# Patient Record
Sex: MALE | Race: WHITE | Employment: FULL TIME | URBAN - METROPOLITAN AREA
[De-identification: names, ages, dates, MRNs, and addresses within clinical notes are randomized per-mention and may not be internally consistent; named-entity substitution may affect disease eponyms.]

---

## 2017-01-30 ENCOUNTER — OFFICE VISIT (OUTPATIENT)
Dept: OPHTHALMOLOGY | Facility: CLINIC | Age: 57
End: 2017-01-30

## 2017-01-30 DIAGNOSIS — H40.003 GLAUCOMA SUSPECT, BILATERAL: Primary | ICD-10-CM

## 2017-01-30 DIAGNOSIS — Z83.511 FAMILY HISTORY OF GLAUCOMA IN MOTHER: ICD-10-CM

## 2017-01-30 DIAGNOSIS — H52.13 MYOPIA WITH PRESBYOPIA OF BOTH EYES: ICD-10-CM

## 2017-01-30 DIAGNOSIS — H01.00A BLEPHARITIS OF UPPER AND LOWER EYELIDS OF BOTH EYES, UNSPECIFIED TYPE: ICD-10-CM

## 2017-01-30 DIAGNOSIS — H01.00B BLEPHARITIS OF UPPER AND LOWER EYELIDS OF BOTH EYES, UNSPECIFIED TYPE: ICD-10-CM

## 2017-01-30 DIAGNOSIS — H52.4 MYOPIA WITH PRESBYOPIA OF BOTH EYES: ICD-10-CM

## 2017-01-30 PROCEDURE — 92014 COMPRE OPH EXAM EST PT 1/>: CPT | Performed by: OPHTHALMOLOGY

## 2017-01-30 PROCEDURE — 92133 CPTRZD OPH DX IMG PST SGM ON: CPT | Performed by: OPHTHALMOLOGY

## 2017-01-30 PROCEDURE — 92083 EXTENDED VISUAL FIELD XM: CPT | Performed by: OPHTHALMOLOGY

## 2017-01-30 RX ORDER — ERYTHROMYCIN 5 MG/G
1 OINTMENT OPHTHALMIC NIGHTLY
Qty: 1 TUBE | Refills: 2 | Status: SHIPPED | OUTPATIENT
Start: 2017-01-30 | End: 2017-02-09

## 2017-01-30 NOTE — PROGRESS NOTES
Kody Obrien is a 49year old male. HPI:     HPI     EP/ 49 yr old here for a VF, OCT and EE. LDE 4/17/15 with glaucoma suspect due to increased cupping 0.4 OU, myopia and blepharitis OU. IOP adjustment of -4 in each eye. Pt's mom has COAG.  Pt denies Right Left   Dist cc 20/20 20/20   Near sc 20/40 20/25       Correction:  Glasses    Distance VA checked in phoropter  Pt forgot glasses      Tonometry (Applanation, 3:26 PM)      Right Left   Pressure 17 16         Pupils     3/3, 2+/2+, no APD        Vis all baby shampoo residue is gone. Alternately, you can use Ocusoft Lid scrubs (which you can purchase over the counter)  twice a day as directed. Start Erythromycin ointment on eyelids and lashes at bedtime in both eyes for 10 nights, then discontinue.

## 2017-01-30 NOTE — PATIENT INSTRUCTIONS
Glaucoma suspect  Visual field and OCT completed in office today with normal results in both eyes that were discussed with patient in office today. Family history of glaucoma in mother  Patient's mother has glaucoma.          Blepharitis    Patient i

## 2017-01-30 NOTE — ASSESSMENT & PLAN NOTE
Patient instructed to use eyelid hygiene twice daily. For baby shampoo eyelid hygiene:   apply baby shampoo to warm washcloth and gently scrub eyelids with eyes closed for 1 minute.    Then rinse thoroughly  with lukewarm water making sure all baby shampo

## 2017-01-30 NOTE — ASSESSMENT & PLAN NOTE
Visual field and OCT completed in office today with normal results in both eyes that were discussed with patient in office today. Intraocular pressure is normal; optic nerves are stable. There is no sign of glaucoma at this time.

## 2017-07-26 ENCOUNTER — TELEPHONE (OUTPATIENT)
Dept: NEPHROLOGY | Facility: CLINIC | Age: 57
End: 2017-07-26

## 2017-07-26 ENCOUNTER — OFFICE VISIT (OUTPATIENT)
Dept: DERMATOLOGY CLINIC | Facility: CLINIC | Age: 57
End: 2017-07-26

## 2017-07-26 DIAGNOSIS — L81.4 LENTIGO: ICD-10-CM

## 2017-07-26 DIAGNOSIS — D23.60 BENIGN NEOPLASM OF SKIN OF UPPER LIMB, INCLUDING SHOULDER, UNSPECIFIED LATERALITY: ICD-10-CM

## 2017-07-26 DIAGNOSIS — Z00.00 BLOOD TESTS FOR ROUTINE GENERAL PHYSICAL EXAMINATION: Primary | ICD-10-CM

## 2017-07-26 DIAGNOSIS — Z12.5 PROSTATE CANCER SCREENING: ICD-10-CM

## 2017-07-26 DIAGNOSIS — L21.9 SEBORRHEIC DERMATITIS: ICD-10-CM

## 2017-07-26 DIAGNOSIS — D23.30 BENIGN NEOPLASM OF SKIN OF FACE: ICD-10-CM

## 2017-07-26 DIAGNOSIS — D23.5 BENIGN NEOPLASM OF SKIN OF TRUNK, EXCEPT SCROTUM: ICD-10-CM

## 2017-07-26 DIAGNOSIS — D22.9 MULTIPLE NEVI: Primary | ICD-10-CM

## 2017-07-26 DIAGNOSIS — D23.70 BENIGN NEOPLASM OF SKIN OF LOWER LIMB, INCLUDING HIP, UNSPECIFIED LATERALITY: ICD-10-CM

## 2017-07-26 DIAGNOSIS — L82.1 SEBORRHEIC KERATOSES: ICD-10-CM

## 2017-07-26 DIAGNOSIS — D23.4 BENIGN NEOPLASM OF SCALP AND SKIN OF NECK: ICD-10-CM

## 2017-07-26 PROCEDURE — 99212 OFFICE O/P EST SF 10 MIN: CPT | Performed by: DERMATOLOGY

## 2017-07-26 PROCEDURE — 99214 OFFICE O/P EST MOD 30 MIN: CPT | Performed by: DERMATOLOGY

## 2017-07-26 NOTE — TELEPHONE ENCOUNTER
Pt is requesting orders for blood work to be put in prior to appt scheduled on 8/28/17.  Pt prefers to go to [facility]. For additional information Please Call [phone]  Thank You

## 2017-07-26 NOTE — TELEPHONE ENCOUNTER
LMTCB. Lab orders entered in system for [facility] and mailed to patient.  Instructed on VM to fast 12 hours for labs and requested a confirmation call back or if patient has any questions or concerns. (they were originally ordered on [facility] lab form but cance

## 2017-08-06 NOTE — PROGRESS NOTES
Isael Alvarado is a 49year old male. HPI:     CC:  Patient presents with:  Full Skin Exam: annual skin exam, father with hx of SCC. concerned about a red gorwing lesion to mid forehead. no discomfort.  one itchy lesion to scalp vertex        Allergies:  Re Myopia with presbyopia of both eyes 4/17/2015   • Nevus of conjunctiva- right eye nasally 4/17/2015   • Ophthalmological disorder     Incresed C/D ratio, +FH Mom with COAG   • Pinguecula of both eyes 2007     Past Surgical History:  No date: ARTHROSCOPY, K ears, back, chest,/ breasts, axillae,  abdomen, arms, legs, palms.      Multiple light to medium brown, well marginated, uniformly pigmented, macules and papules 6 mm and less scattered on exam. pigmented lesions examined with dermoscopy benign-appearing pa grid.  Instructions reviewed at length. Benign nevi, seborrheic  keratoses, cherry angiomas:  Reassurance regarding other benign skin lesions. Signs and symptoms of skin cancer, ABCDE's of melanoma discussed with patient.  Sunscreen use, sun protection, s

## 2017-08-24 ENCOUNTER — TELEPHONE (OUTPATIENT)
Dept: NEPHROLOGY | Facility: CLINIC | Age: 57
End: 2017-08-24

## 2017-08-24 NOTE — TELEPHONE ENCOUNTER
Pt would like to know if office has received his results from blood work done at [facility] on 8/8/17.  Please Call [phone] Thank You

## 2017-08-28 ENCOUNTER — OFFICE VISIT (OUTPATIENT)
Dept: NEPHROLOGY | Facility: CLINIC | Age: 57
End: 2017-08-28

## 2017-08-28 VITALS
SYSTOLIC BLOOD PRESSURE: 104 MMHG | BODY MASS INDEX: 21.23 KG/M2 | HEART RATE: 58 BPM | HEIGHT: 77 IN | WEIGHT: 179.81 LBS | DIASTOLIC BLOOD PRESSURE: 60 MMHG

## 2017-08-28 DIAGNOSIS — Z00.00 ROUTINE GENERAL MEDICAL EXAMINATION AT A HEALTH CARE FACILITY: Primary | ICD-10-CM

## 2017-08-28 PROCEDURE — 99396 PREV VISIT EST AGE 40-64: CPT | Performed by: INTERNAL MEDICINE

## 2017-08-28 RX ORDER — SILDENAFIL 50 MG/1
50 TABLET, FILM COATED ORAL
Qty: 6 TABLET | Refills: 3 | Status: SHIPPED | OUTPATIENT
Start: 2017-08-28 | End: 2019-09-16

## 2017-08-28 NOTE — TELEPHONE ENCOUNTER
Spoke to Customer Service at [facility] and they will fax results from 8/8/17. Patient has an appointment for today 8/28/17 with Dr. Reji Moreno.

## 2017-08-28 NOTE — PROGRESS NOTES
[facility]  Nephrology Daily Progress Note    Eric Perrin  [id number]  56year old  Patient presents with: Annual      HPI:   Eric Perrin is a 56year old male.   82-year-old male with a history of borderline cholesterols and erectile dysfunction noted  Respiratory: normal to inspection lungs are clear to auscultation bilaterally normal respiratory effort  Cardiovascular: regular rate and rhythm no murmurs, gallups, or rubs  Abdomen: soft non-tender non-distended no organomegaly noted no masses  Mu this encounter.       8/28/2017  Lanre Mathew MD

## 2017-09-27 ENCOUNTER — TELEPHONE (OUTPATIENT)
Dept: GASTROENTEROLOGY | Facility: CLINIC | Age: 57
End: 2017-09-27

## 2017-11-02 ENCOUNTER — TELEPHONE (OUTPATIENT)
Dept: GASTROENTEROLOGY | Facility: CLINIC | Age: 57
End: 2017-11-02

## 2017-11-02 NOTE — TELEPHONE ENCOUNTER
Pt received letter to schedule cln/procedure, pt informed about the 72 hr cb. Pls call at:[phone],thanks.

## 2017-11-02 NOTE — TELEPHONE ENCOUNTER
Last Procedure:  10/24/14  Last Diagnosis:  Family history of colon cancer, history of colon polyps  Recalled for (years): 3 years  Sedation used previously:  IV sedation  Last Prep Used (if known):  Miralax prep  Quality of prep (if known):  Prep was exce

## 2017-11-06 NOTE — TELEPHONE ENCOUNTER
Spoke with pt. Confirmed this time he DOES wish to proceed with IV sedation/Conscious sedation .     Confirmed no daily medication and no allergies to medications    No complicate or new medical history

## 2017-11-06 NOTE — TELEPHONE ENCOUNTER
Noted. Thank you. The patient's chart has been reviewed. Okay to schedule pt for 3 year colonoscopy recall r/t family history of colon cancer, history of colon polyps with Dr. Nesha Alvarado.      Advise IV [place] sedation with split dose Suprep (eRx)

## 2017-11-06 NOTE — TELEPHONE ENCOUNTER
Nursing: the report from 2014 indicates the pt completed the last colonoscopy w/o sedation. Does he wish to proceed the same way this time? If so, please make pt aware he will at least need IV access incase sedation needs to be administered.

## 2017-11-07 NOTE — TELEPHONE ENCOUNTER
Scheduled for:  Colonoscopy - 46059  Provider Name:  Dr. Susi Calderón  Date:  1/19/18  Location:  [facility]  Sedation:  IV  Time:  (pt is aware that [facility] will call with arrival time)  Prep:  Suprep, Prep instructions were given to pt over the phone, pt verbalized [place]

## 2018-01-15 ENCOUNTER — TELEPHONE (OUTPATIENT)
Dept: GASTROENTEROLOGY | Facility: CLINIC | Age: 58
End: 2018-01-15

## 2018-04-04 ENCOUNTER — TELEPHONE (OUTPATIENT)
Dept: GASTROENTEROLOGY | Facility: CLINIC | Age: 58
End: 2018-04-04

## 2018-04-04 NOTE — TELEPHONE ENCOUNTER
Pt requesting RN to obtain Prior auth for  4/13/2018 CLN. PLs call [facility] at [phone]. Thank you.

## 2018-04-04 NOTE — TELEPHONE ENCOUNTER
Spoke to Lisa from [facility], [facility] No PA is required. Reference number: [id number]. Spoke to pt, informed no PA is required. Pt was provided with Reference number. Pt verbalized understanding of whole message and had no further questions at this time.

## 2018-04-13 ENCOUNTER — LAB REQUISITION (OUTPATIENT)
Dept: LAB | Facility: HOSPITAL | Age: 58
End: 2018-04-13
Payer: COMMERCIAL

## 2018-04-13 DIAGNOSIS — Z01.89 ENCOUNTER FOR OTHER SPECIFIED SPECIAL EXAMINATIONS: ICD-10-CM

## 2018-04-13 PROCEDURE — 88305 TISSUE EXAM BY PATHOLOGIST: CPT | Performed by: INTERNAL MEDICINE

## 2018-04-24 ENCOUNTER — TELEPHONE (OUTPATIENT)
Dept: GASTROENTEROLOGY | Facility: CLINIC | Age: 58
End: 2018-04-24

## 2018-04-24 NOTE — TELEPHONE ENCOUNTER
Notes recorded by Thomas Pinto MD on 4/23/2018 at 7:32 PM CDT  Please see the following my chart message sent to the patient:    \"Jhonatan,    I left a message on your voicemail.     Your colonoscopy revealed one small polyp which was removed.  This po

## 2018-04-27 ENCOUNTER — HOSPITAL ENCOUNTER (OUTPATIENT)
Dept: CT IMAGING | Facility: HOSPITAL | Age: 58
Discharge: HOME | End: 2018-04-27
Attending: INTERNAL MEDICINE

## 2018-04-27 VITALS — SYSTOLIC BLOOD PRESSURE: 110 MMHG | HEART RATE: 64 BPM | DIASTOLIC BLOOD PRESSURE: 76 MMHG

## 2018-04-27 DIAGNOSIS — Z13.9 SCREENING FOR CONDITION: ICD-10-CM

## 2018-07-06 ENCOUNTER — TELEPHONE (OUTPATIENT)
Dept: NEPHROLOGY | Facility: CLINIC | Age: 58
End: 2018-07-06

## 2018-07-06 DIAGNOSIS — Z01.89 ROUTINE LAB DRAW: Primary | ICD-10-CM

## 2018-07-06 DIAGNOSIS — Z12.5 PROSTATE CANCER SCREENING: ICD-10-CM

## 2018-07-06 NOTE — TELEPHONE ENCOUNTER
Orders entered in system. Patient contacted. He will get lab work done at [facility]. [employer] and mailed to patient. Instructed to fast 12 hours for labs.

## 2018-07-06 NOTE — TELEPHONE ENCOUNTER
Pt is scheduled to see dr Afia Ingram in august and would like orders to be out in the system for labs and urinalysis please call thank you.

## 2018-08-06 ENCOUNTER — TELEPHONE (OUTPATIENT)
Dept: NEPHROLOGY | Facility: CLINIC | Age: 58
End: 2018-08-06

## 2018-08-06 NOTE — TELEPHONE ENCOUNTER
BONILLA reviewed labs from 7/26/18. Per BONILLA, labs in. Follow up as scheduled. Attempted to contact pt.  Left msg per consent on file that [facility] received results and to follow up with [facility] as scheduled on 8/28/18 at 4 PM. Lab results sent to scanning and copy kept

## 2018-08-28 ENCOUNTER — OFFICE VISIT (OUTPATIENT)
Dept: NEPHROLOGY | Facility: CLINIC | Age: 58
End: 2018-08-28
Payer: COMMERCIAL

## 2018-08-28 VITALS
HEIGHT: 77 IN | DIASTOLIC BLOOD PRESSURE: 61 MMHG | WEIGHT: 176.19 LBS | HEART RATE: 58 BPM | BODY MASS INDEX: 20.8 KG/M2 | SYSTOLIC BLOOD PRESSURE: 99 MMHG

## 2018-08-28 DIAGNOSIS — Z12.5 ENCOUNTER FOR PROSTATE CANCER SCREENING: Primary | ICD-10-CM

## 2018-08-28 DIAGNOSIS — Z00.00 ROUTINE GENERAL MEDICAL EXAMINATION AT A HEALTH CARE FACILITY: ICD-10-CM

## 2018-08-28 PROCEDURE — 99215 OFFICE O/P EST HI 40 MIN: CPT | Performed by: INTERNAL MEDICINE

## 2018-08-28 PROCEDURE — 99212 OFFICE O/P EST SF 10 MIN: CPT | Performed by: INTERNAL MEDICINE

## 2018-08-28 NOTE — TELEPHONE ENCOUNTER
LOV 7/26/2017. Pt has appointment scheduled for 9/28/2018. Ok to refill until appointment? Kindly advise.

## 2018-08-28 NOTE — PROGRESS NOTES
[facility]  Nephrology Daily Progress Note    Alla Faria  [id number]  66year old  Patient presents with: Annual      HPI:   Alla Faria is a 66year old male.   61-year-old male with a history of borderline cholesterols and erectile dysfunction clear to auscultation bilaterally normal respiratory effort  Cardiovascular: regular rate and rhythm no murmurs, gallups, or rubs  Abdomen: soft non-tender non-distended no organomegaly noted no masses  Musculoskeletal: full ROM all extremities good streng done on July 26, 2018. Lipids actually were bit better with a cholesterol of 186 and an LDL of 99. HDL was 76. His PSA however was 0.7 a year ago now it is 2.5. Therefore recommended repeating a PSA in 3-4 months to ensure stability.   Otherwise see allie

## 2018-09-07 ENCOUNTER — TELEPHONE (OUTPATIENT)
Dept: DERMATOLOGY CLINIC | Facility: CLINIC | Age: 58
End: 2018-09-07

## 2018-09-28 ENCOUNTER — OFFICE VISIT (OUTPATIENT)
Dept: DERMATOLOGY CLINIC | Facility: CLINIC | Age: 58
End: 2018-09-28
Payer: COMMERCIAL

## 2018-09-28 DIAGNOSIS — L81.4 LENTIGO: ICD-10-CM

## 2018-09-28 DIAGNOSIS — L82.1 SEBORRHEIC KERATOSES: ICD-10-CM

## 2018-09-28 DIAGNOSIS — D23.70 BENIGN NEOPLASM OF SKIN OF LOWER LIMB, INCLUDING HIP, UNSPECIFIED LATERALITY: ICD-10-CM

## 2018-09-28 DIAGNOSIS — D23.60 BENIGN NEOPLASM OF SKIN OF UPPER LIMB, INCLUDING SHOULDER, UNSPECIFIED LATERALITY: ICD-10-CM

## 2018-09-28 DIAGNOSIS — D23.5 BENIGN NEOPLASM OF SKIN OF TRUNK, EXCEPT SCROTUM: ICD-10-CM

## 2018-09-28 DIAGNOSIS — D23.4 BENIGN NEOPLASM OF SCALP AND SKIN OF NECK: ICD-10-CM

## 2018-09-28 DIAGNOSIS — L21.9 SEBORRHEIC DERMATITIS: ICD-10-CM

## 2018-09-28 DIAGNOSIS — D23.30 BENIGN NEOPLASM OF SKIN OF FACE: ICD-10-CM

## 2018-09-28 DIAGNOSIS — D22.9 MULTIPLE NEVI: Primary | ICD-10-CM

## 2018-09-28 PROCEDURE — 99214 OFFICE O/P EST MOD 30 MIN: CPT | Performed by: DERMATOLOGY

## 2018-09-28 PROCEDURE — 99212 OFFICE O/P EST SF 10 MIN: CPT | Performed by: DERMATOLOGY

## 2018-10-08 NOTE — PROGRESS NOTES
Twan Reyes is a 66year old male.   HPI:     CC:  Patient presents with:  Full Skin Exam: LOV 07/26/17 pt seen for full body check here today for full body check pt has a lesion on his mid forehead that hes had treated with crypo in the past, Father with Diagnosis Date   • Blepharitis 4/17/2015   • Blepharitis of both eyes 2002   • Cataracts, bilateral 2007   • Family history of glaucoma in mother 4/17/2015   • Glaucoma suspect 4/17/2015   • Myopia with presbyopia of both eyes 4/17/2015   • Nevus of conj History Narrative      Not on file    Family History   Problem Relation Age of Onset   • Hypertension Father    • Squamous Cell Father 80        SCC to ear.    • Cancer Mother         Cancer-colon   • Glaucoma Mother    • Diabetes Neg    • Macular degenerat ordered in this encounter         Multiple nevi  (primary encounter diagnosis)  Benign neoplasm of scalp and skin of neck  Benign neoplasm of skin of face  Benign neoplasm of skin of lower limb, including hip, unspecified laterality  Benign neoplasm of ski

## 2018-11-19 ENCOUNTER — TELEPHONE (OUTPATIENT)
Dept: NEPHROLOGY | Facility: CLINIC | Age: 58
End: 2018-11-19

## 2018-11-19 NOTE — TELEPHONE ENCOUNTER
BONILLA reviewed PSA from 11/1/18 that was faxed to office by Aubree Brooks. Per BONILLA, PSA normal, back down to 1.0. Left pt message per consent on file of the above info and advised to call office with any questions.

## 2018-12-10 ENCOUNTER — OFFICE VISIT (OUTPATIENT)
Dept: OPHTHALMOLOGY | Facility: CLINIC | Age: 58
End: 2018-12-10
Payer: COMMERCIAL

## 2018-12-10 DIAGNOSIS — H40.003 GLAUCOMA SUSPECT OF BOTH EYES: Primary | ICD-10-CM

## 2018-12-10 DIAGNOSIS — Z83.511 FAMILY HISTORY OF GLAUCOMA IN MOTHER: ICD-10-CM

## 2018-12-10 DIAGNOSIS — H01.00B BLEPHARITIS OF UPPER AND LOWER EYELIDS OF BOTH EYES, UNSPECIFIED TYPE: ICD-10-CM

## 2018-12-10 DIAGNOSIS — H52.13 MYOPIA WITH PRESBYOPIA OF BOTH EYES: ICD-10-CM

## 2018-12-10 DIAGNOSIS — H01.00A BLEPHARITIS OF UPPER AND LOWER EYELIDS OF BOTH EYES, UNSPECIFIED TYPE: ICD-10-CM

## 2018-12-10 DIAGNOSIS — H52.4 MYOPIA WITH PRESBYOPIA OF BOTH EYES: ICD-10-CM

## 2018-12-10 PROCEDURE — 92015 DETERMINE REFRACTIVE STATE: CPT | Performed by: OPHTHALMOLOGY

## 2018-12-10 PROCEDURE — 92014 COMPRE OPH EXAM EST PT 1/>: CPT | Performed by: OPHTHALMOLOGY

## 2018-12-10 PROCEDURE — 92133 CPTRZD OPH DX IMG PST SGM ON: CPT | Performed by: OPHTHALMOLOGY

## 2018-12-10 NOTE — PATIENT INSTRUCTIONS
Blepharitis  Patient instructed to use lid hygiene daily. Apply baby shampoo to warm washcloth and scrub eyelids gently with eyes closed, then rinse thoroughly. Glaucoma suspect  Doing well.  IOP and OCT normal.    Myopia with presbyopia of both eye

## 2018-12-10 NOTE — PROGRESS NOTES
Raul Reyes is a 66year old male. HPI:     HPI     EP/66year old here for an OCT and complete exam.  LDE was 1/30/17 with a hx of  glaucoma suspect due to increased cupping 0.4 OU, myopia and blepharitis OU. IOP adjustment of -4 in each eye.   Pt's m metRONIDAZOLE 0.75 % External Cream Use bid to affected areas of face Disp: 45 g Rfl: 2       Allergies:  No Known Allergies    ROS:       PHYSICAL EXAM:     Base Eye Exam     Visual Acuity (Snellen - Linear)       Right Left    Dist cc 20/20 20/20    Co well. IOP and OCT normal.    Myopia with presbyopia of both eyes  New glasses today.      Family history of glaucoma in mother  Mom with glaucoma      Orders Placed This Encounter      OCT, Optic Nerve - OU - Both Eyes      Meds This Visit:  Requested Presc

## 2019-08-23 ENCOUNTER — TELEPHONE (OUTPATIENT)
Dept: NEPHROLOGY | Facility: CLINIC | Age: 59
End: 2019-08-23

## 2019-08-23 DIAGNOSIS — Z00.00 LABORATORY EXAMINATION ORDERED AS PART OF A ROUTINE GENERAL MEDICAL EXAMINATION: Primary | ICD-10-CM

## 2019-08-23 DIAGNOSIS — Z12.5 PROSTATE CANCER SCREENING: ICD-10-CM

## 2019-08-23 NOTE — TELEPHONE ENCOUNTER
Orders entered in [address] [place] [address] for [employer]. Left detailed message on  regarding lab orders and fasting 12 hours. Patient has already scheduled a follow up visit with Dr. Daniella Caro for 9/13/19.

## 2019-08-28 NOTE — TELEPHONE ENCOUNTER
Pt requesting lab orders to be mailed out to home address - he will be going to [address] mail to:    [address] [facility] [place], [place]    For add'l questions pls call pt. Thank you.

## 2019-09-11 LAB
ABSOLUTE BASOPHILS: 48 CELLS/UL (ref 0–200)
ABSOLUTE EOSINOPHILS: 456 CELLS/UL (ref 15–500)
ABSOLUTE LYMPHOCYTES: 1482 CELLS/UL (ref 850–3900)
ABSOLUTE MONOCYTES: 444 CELLS/UL (ref 200–950)
ABSOLUTE NEUTROPHILS: 3570 CELLS/UL (ref 1500–7800)
ALBUMIN/GLOBULIN RATIO: 1.9 (CALC) (ref 1–2.5)
ALBUMIN: 4.5 G/DL (ref 3.6–5.1)
ALKALINE PHOSPHATASE: 66 U/L (ref 40–115)
ALT: 15 U/L (ref 9–46)
APPEARANCE: CLEAR
AST: 17 U/L (ref 10–35)
BASOPHILS: 0.8 %
BILIRUBIN, TOTAL: 0.6 MG/DL (ref 0.2–1.2)
BILIRUBIN: NEGATIVE
BUN: 11 MG/DL (ref 7–25)
CALCIUM: 9.3 MG/DL (ref 8.6–10.3)
CARBON DIOXIDE: 30 MMOL/L (ref 20–32)
CHLORIDE: 99 MMOL/L (ref 98–110)
CHOL/HDLC RATIO: 2.6 (CALC)
CHOLESTEROL, TOTAL: 180 MG/DL
COLOR: YELLOW
CREATININE: 1.03 MG/DL (ref 0.7–1.33)
EGFR IF AFRICN AM: 93 ML/MIN/1.73M2
EGFR IF NONAFRICN AM: 80 ML/MIN/1.73M2
EOSINOPHILS: 7.6 %
GLOBULIN: 2.4 G/DL (CALC) (ref 1.9–3.7)
GLUCOSE: 84 MG/DL (ref 65–99)
GLUCOSE: NEGATIVE
HDL CHOLESTEROL: 70 MG/DL
HEMATOCRIT: 42.4 % (ref 38.5–50)
HEMOGLOBIN: 14.3 G/DL (ref 13.2–17.1)
KETONES: NEGATIVE
LDL-CHOLESTEROL: 95 MG/DL (CALC)
LEUKOCYTE ESTERASE: NEGATIVE
LYMPHOCYTES: 24.7 %
MCH: 29.7 PG (ref 27–33)
MCHC: 33.7 G/DL (ref 32–36)
MCV: 88.1 FL (ref 80–100)
MONOCYTES: 7.4 %
MPV: 11.3 FL (ref 7.5–12.5)
NEUTROPHILS: 59.5 %
NITRITE: NEGATIVE
NON-HDL CHOLESTEROL: 110 MG/DL (CALC)
OCCULT BLOOD: NEGATIVE
PH: 7.5 (ref 5–8)
PLATELET COUNT: 200 THOUSAND/UL (ref 140–400)
POTASSIUM: 4.6 MMOL/L (ref 3.5–5.3)
PROTEIN, TOTAL: 6.9 G/DL (ref 6.1–8.1)
PROTEIN: NEGATIVE
PSA, TOTAL: 0.9 NG/ML
RDW: 12.2 % (ref 11–15)
RED BLOOD CELL COUNT: 4.81 MILLION/UL (ref 4.2–5.8)
SODIUM: 135 MMOL/L (ref 135–146)
SPECIFIC GRAVITY: 1.01 (ref 1–1.03)
TRIGLYCERIDES: 65 MG/DL
WHITE BLOOD CELL COUNT: 6 THOUSAND/UL (ref 3.8–10.8)

## 2019-09-16 ENCOUNTER — OFFICE VISIT (OUTPATIENT)
Dept: NEPHROLOGY | Facility: CLINIC | Age: 59
End: 2019-09-16
Payer: COMMERCIAL

## 2019-09-16 VITALS
WEIGHT: 177.81 LBS | BODY MASS INDEX: 20.99 KG/M2 | DIASTOLIC BLOOD PRESSURE: 64 MMHG | SYSTOLIC BLOOD PRESSURE: 100 MMHG | HEART RATE: 56 BPM | HEIGHT: 77 IN

## 2019-09-16 DIAGNOSIS — Z00.00 ROUTINE GENERAL MEDICAL EXAMINATION AT A HEALTH CARE FACILITY: Primary | ICD-10-CM

## 2019-09-16 PROCEDURE — 99396 PREV VISIT EST AGE 40-64: CPT | Performed by: INTERNAL MEDICINE

## 2019-09-16 RX ORDER — SILDENAFIL 50 MG/1
50 TABLET, FILM COATED ORAL
Qty: 6 TABLET | Refills: 5 | Status: SHIPPED | OUTPATIENT
Start: 2019-09-16 | End: 2020-09-18

## 2019-09-16 NOTE — PROGRESS NOTES
[facility]  Nephrology Daily Progress Note    Alla Faria  [id number]  64year old  Patient presents with: Annual      HPI:   Alla Faria is a 64year old male.   82-year-old male with a history of borderline cholesterol, actinic keratoses, hemorr cervical, supraclavicular or axillary adenopathy is noted  Respiratory: normal to inspection lungs are clear to auscultation bilaterally normal respiratory effort  Cardiovascular: regular rate and rhythm no murmurs, gallups, or rubs  Abdomen: soft non-tend well.  Reviewed recent labs done on September 10, 2019. All laboratory values were within normal limits including lipid profile and PSA. See yearly or as needed. No orders of the defined types were placed in this encounter.       9/16/2019  Jada Becerril

## 2019-10-30 ENCOUNTER — OFFICE VISIT (OUTPATIENT)
Dept: DERMATOLOGY CLINIC | Facility: CLINIC | Age: 59
End: 2019-10-30
Payer: COMMERCIAL

## 2019-10-30 DIAGNOSIS — D23.60 BENIGN NEOPLASM OF SKIN OF UPPER LIMB, INCLUDING SHOULDER, UNSPECIFIED LATERALITY: ICD-10-CM

## 2019-10-30 DIAGNOSIS — L82.1 SEBORRHEIC KERATOSES: ICD-10-CM

## 2019-10-30 DIAGNOSIS — D23.70 BENIGN NEOPLASM OF SKIN OF LOWER LIMB, INCLUDING HIP, UNSPECIFIED LATERALITY: ICD-10-CM

## 2019-10-30 DIAGNOSIS — D23.30 BENIGN NEOPLASM OF SKIN OF FACE: ICD-10-CM

## 2019-10-30 DIAGNOSIS — D23.4 BENIGN NEOPLASM OF SCALP AND SKIN OF NECK: ICD-10-CM

## 2019-10-30 DIAGNOSIS — D22.9 MULTIPLE NEVI: Primary | ICD-10-CM

## 2019-10-30 DIAGNOSIS — D23.5 BENIGN NEOPLASM OF SKIN OF TRUNK, EXCEPT SCROTUM: ICD-10-CM

## 2019-10-30 DIAGNOSIS — L81.4 LENTIGO: ICD-10-CM

## 2019-10-30 PROCEDURE — 99212 OFFICE O/P EST SF 10 MIN: CPT | Performed by: DERMATOLOGY

## 2019-10-30 PROCEDURE — 99213 OFFICE O/P EST LOW 20 MIN: CPT | Performed by: DERMATOLOGY

## 2019-11-11 NOTE — PROGRESS NOTES
Benita Morales is a 64year old male. HPI:     CC:  Patient presents with:  Full Skin Exam: LOV 9/28/2018  Patient present for full body skin exam . Patient denies personal hx of sc        Allergies:  Patient has no known allergies.     HISTORY:    Past Med both eyes 2007     Past Surgical History:   Procedure Laterality Date   • ARTHROSCOPY, KNEE, SURGICAL; W/LATERAL RELEASE       Social History    Socioeconomic History      Marital status:       Spouse name: Not on file      Number of children: Not o Sleep Concern: Not Asked        Stress Concern: Not Asked        Weight Concern: Not Asked        Special Diet: Not Asked        Back Care: Not Asked        Exercise: Not Asked        Bike Helmet: Not Asked        Seat Belt: Not Asked        Self-Exams: No mm and less scattered on exam. pigmented lesions examined with dermoscopy benign-appearing patterns. Waxy tannish keratotic papules scattered, cherry-red vascular papules scattered. See map today's date for lesions noted .       Otherwise remarkable diagnoses. Pros cons of various therapies, risks benefits discussed. Pathophysiology discussed with patient. Therapeutic options reviewed. See  Medications in grid. Instructions reviewed at length.     Benign nevi, seborrheic  keratoses, cherry angiomas:

## 2020-02-20 ENCOUNTER — HOSPITAL ENCOUNTER (OUTPATIENT)
Dept: GENERAL RADIOLOGY | Facility: HOSPITAL | Age: 60
Discharge: HOME | End: 2020-02-20
Attending: PODIATRIST
Payer: COMMERCIAL

## 2020-02-20 ENCOUNTER — OFFICE VISIT (OUTPATIENT)
Dept: PODIATRY CLINIC | Facility: CLINIC | Age: 60
End: 2020-02-20
Payer: COMMERCIAL

## 2020-02-20 DIAGNOSIS — M20.11 HALLUX VALGUS OF RIGHT FOOT: ICD-10-CM

## 2020-02-20 DIAGNOSIS — M79.674 PAIN OF TOE OF RIGHT FOOT: ICD-10-CM

## 2020-02-20 DIAGNOSIS — L84 HELOMA DURUM: ICD-10-CM

## 2020-02-20 DIAGNOSIS — M20.41 HAMMER TOE OF RIGHT FOOT: Primary | ICD-10-CM

## 2020-02-20 DIAGNOSIS — M20.41 HAMMER TOE OF RIGHT FOOT: ICD-10-CM

## 2020-02-20 PROCEDURE — 99212 OFFICE O/P EST SF 10 MIN: CPT | Performed by: PODIATRIST

## 2020-02-20 PROCEDURE — 99203 OFFICE O/P NEW LOW 30 MIN: CPT | Performed by: PODIATRIST

## 2020-02-20 PROCEDURE — 73630 X-RAY EXAM OF FOOT: CPT | Performed by: PODIATRIST

## 2020-02-21 NOTE — PROGRESS NOTES
Toshia Henderson is a 64year old male. Patient presents with:  Consult: right hallux stiffness and right 2nd toe injury from years ago -- no xrays taken. Toes have a little swelling occasionally. Rates pain  2-8/10. States hallux has a little numbness. Socioeconomic History      Marital status:       Spouse name: Not on file      Number of children: Not on file      Years of education: Not on file      Highest education level: Not on file    Tobacco Use      Smoking status: Never Smoker      Smoke has some diminished joint space. There is some osteoarthritic changes noted to the first MTP joint. There is a deviation of the articular surface of the second toe this is deviated laterally.   There is prominence of the head of the proximal phalanx adjac

## 2020-08-17 ENCOUNTER — TELEPHONE (OUTPATIENT)
Dept: GASTROENTEROLOGY | Facility: CLINIC | Age: 60
End: 2020-08-17

## 2020-08-17 DIAGNOSIS — Z00.00 LABORATORY EXAMINATION ORDERED AS PART OF A ROUTINE GENERAL MEDICAL EXAMINATION: Primary | ICD-10-CM

## 2020-08-17 DIAGNOSIS — Z12.5 SPECIAL SCREENING FOR MALIGNANT NEOPLASM OF PROSTATE: ICD-10-CM

## 2020-08-17 NOTE — TELEPHONE ENCOUNTER
Pt requesting to have orders for labs sent to [address] [place] [address] located in [facility] on [place].   Pt asking for labs to be coding as routine check up or annual physical.

## 2020-08-17 NOTE — TELEPHONE ENCOUNTER
Orders entered in system. We don't have any [facility] fax numbers so orders mailed to patient.

## 2020-08-29 LAB
ABSOLUTE BASOPHILS: 74 CELLS/UL (ref 0–200)
ABSOLUTE EOSINOPHILS: 563 CELLS/UL (ref 15–500)
ABSOLUTE LYMPHOCYTES: 1487 CELLS/UL (ref 850–3900)
ABSOLUTE MONOCYTES: 516 CELLS/UL (ref 200–950)
ABSOLUTE NEUTROPHILS: 4060 CELLS/UL (ref 1500–7800)
ALBUMIN/GLOBULIN RATIO: 2.7 (CALC) (ref 1–2.5)
ALBUMIN: 4.9 G/DL (ref 3.6–5.1)
ALKALINE PHOSPHATASE: 65 U/L (ref 35–144)
ALT: 12 U/L (ref 9–46)
APPEARANCE: CLEAR
AST: 16 U/L (ref 10–35)
BASOPHILS: 1.1 %
BILIRUBIN, TOTAL: 0.5 MG/DL (ref 0.2–1.2)
BILIRUBIN: NEGATIVE
BUN: 11 MG/DL (ref 7–25)
CALCIUM: 9.4 MG/DL (ref 8.6–10.3)
CARBON DIOXIDE: 30 MMOL/L (ref 20–32)
CHLORIDE: 103 MMOL/L (ref 98–110)
CHOL/HDLC RATIO: 2.2 (CALC)
CHOLESTEROL, TOTAL: 172 MG/DL
COLOR: YELLOW
CREATININE: 1.02 MG/DL (ref 0.7–1.33)
EGFR IF AFRICN AM: 93 ML/MIN/1.73M2
EGFR IF NONAFRICN AM: 81 ML/MIN/1.73M2
EOSINOPHILS: 8.4 %
GLOBULIN: 1.8 G/DL (CALC) (ref 1.9–3.7)
GLUCOSE: 91 MG/DL (ref 65–99)
GLUCOSE: NEGATIVE
HDL CHOLESTEROL: 78 MG/DL
HEMATOCRIT: 40.2 % (ref 38.5–50)
HEMOGLOBIN: 13.3 G/DL (ref 13.2–17.1)
KETONES: NEGATIVE
LDL-CHOLESTEROL: 81 MG/DL (CALC)
LEUKOCYTE ESTERASE: NEGATIVE
LYMPHOCYTES: 22.2 %
MCH: 28.7 PG (ref 27–33)
MCHC: 33.1 G/DL (ref 32–36)
MCV: 86.8 FL (ref 80–100)
MONOCYTES: 7.7 %
MPV: 11.5 FL (ref 7.5–12.5)
NEUTROPHILS: 60.6 %
NITRITE: NEGATIVE
NON-HDL CHOLESTEROL: 94 MG/DL (CALC)
OCCULT BLOOD: NEGATIVE
PH: 7.5 (ref 5–8)
PLATELET COUNT: 193 THOUSAND/UL (ref 140–400)
POTASSIUM: 4.8 MMOL/L (ref 3.5–5.3)
PROTEIN, TOTAL: 6.7 G/DL (ref 6.1–8.1)
PROTEIN: NEGATIVE
PSA, TOTAL: 0.7 NG/ML
RDW: 12.4 % (ref 11–15)
RED BLOOD CELL COUNT: 4.63 MILLION/UL (ref 4.2–5.8)
SODIUM: 138 MMOL/L (ref 135–146)
SPECIFIC GRAVITY: 1.01 (ref 1–1.03)
TRIGLYCERIDES: 52 MG/DL
WHITE BLOOD CELL COUNT: 6.7 THOUSAND/UL (ref 3.8–10.8)

## 2020-09-18 ENCOUNTER — OFFICE VISIT (OUTPATIENT)
Dept: NEPHROLOGY | Facility: CLINIC | Age: 60
End: 2020-09-18
Payer: COMMERCIAL

## 2020-09-18 VITALS
BODY MASS INDEX: 21.21 KG/M2 | HEIGHT: 77 IN | DIASTOLIC BLOOD PRESSURE: 66 MMHG | HEART RATE: 65 BPM | TEMPERATURE: 97 F | WEIGHT: 179.63 LBS | SYSTOLIC BLOOD PRESSURE: 107 MMHG

## 2020-09-18 DIAGNOSIS — Z00.00 ROUTINE GENERAL MEDICAL EXAMINATION AT A HEALTH CARE FACILITY: Primary | ICD-10-CM

## 2020-09-18 PROCEDURE — 3008F BODY MASS INDEX DOCD: CPT | Performed by: INTERNAL MEDICINE

## 2020-09-18 PROCEDURE — G0463 HOSPITAL OUTPT CLINIC VISIT: HCPCS | Performed by: INTERNAL MEDICINE

## 2020-09-18 PROCEDURE — 99396 PREV VISIT EST AGE 40-64: CPT | Performed by: INTERNAL MEDICINE

## 2020-09-18 PROCEDURE — 3074F SYST BP LT 130 MM HG: CPT | Performed by: INTERNAL MEDICINE

## 2020-09-18 PROCEDURE — 3078F DIAST BP <80 MM HG: CPT | Performed by: INTERNAL MEDICINE

## 2020-09-18 RX ORDER — SILDENAFIL 50 MG/1
50 TABLET, FILM COATED ORAL
Qty: 6 TABLET | Refills: 5 | Status: SHIPPED | OUTPATIENT
Start: 2020-09-18

## 2020-09-19 NOTE — PROGRESS NOTES
[facility]  Nephrology Daily Progress Note    Garrett Golden  [id number]  64year old  Patient presents with: Annual      HPI:   Garrett Golden is a 64year old male.   17-year-old male with a history of borderline cholesterol, actinic keratoses, hemorr inspection lungs are clear to auscultation bilaterally normal respiratory effort  Cardiovascular: regular rate and rhythm no murmurs, gallups, or rubs  Abdomen: soft non-tender non-distended no organomegaly noted no masses  Musculoskeletal: full ROM all ex Lipid panel actually better now. Has been watching his diet more carefully. See dermatology yearly. He would prefer a male dermatologist.  Referred to see Dr. Trinity Mims. See yearly or as needed.          No orders of the defined types were placed in th

## 2021-08-11 ENCOUNTER — TELEPHONE (OUTPATIENT)
Dept: NEPHROLOGY | Facility: CLINIC | Age: 61
End: 2021-08-11

## 2021-08-11 DIAGNOSIS — Z00.00 LABORATORY EXAMINATION ORDERED AS PART OF A ROUTINE GENERAL MEDICAL EXAMINATION: Primary | ICD-10-CM

## 2021-08-11 DIAGNOSIS — Z12.5 SPECIAL SCREENING FOR MALIGNANT NEOPLASM OF PROSTATE: ICD-10-CM

## 2021-08-11 NOTE — TELEPHONE ENCOUNTER
Pt called in requesting for blood work order to be put in prior to his next appt sent to [facility].  Please call pt when order is put in

## 2021-08-26 LAB
ABSOLUTE BASOPHILS: 50 CELLS/UL (ref 0–200)
ABSOLUTE EOSINOPHILS: 280 CELLS/UL (ref 15–500)
ABSOLUTE LYMPHOCYTES: 1333 CELLS/UL (ref 850–3900)
ABSOLUTE MONOCYTES: 459 CELLS/UL (ref 200–950)
ABSOLUTE NEUTROPHILS: 3478 CELLS/UL (ref 1500–7800)
ALBUMIN/GLOBULIN RATIO: 1.8 (CALC) (ref 1–2.5)
ALBUMIN: 4.4 G/DL (ref 3.6–5.1)
ALKALINE PHOSPHATASE: 65 U/L (ref 35–144)
ALT: 14 U/L (ref 9–46)
APPEARANCE: CLEAR
AST: 16 U/L (ref 10–35)
BASOPHILS: 0.9 %
BILIRUBIN, TOTAL: 0.6 MG/DL (ref 0.2–1.2)
BILIRUBIN: NEGATIVE
BUN: 11 MG/DL (ref 7–25)
CALCIUM: 9.4 MG/DL (ref 8.6–10.3)
CARBON DIOXIDE: 30 MMOL/L (ref 20–32)
CHLORIDE: 100 MMOL/L (ref 98–110)
CHOL/HDLC RATIO: 2.5 (CALC)
CHOLESTEROL, TOTAL: 182 MG/DL
COLOR: YELLOW
CREATININE: 1.04 MG/DL (ref 0.7–1.33)
EGFR IF AFRICN AM: 91 ML/MIN/1.73M2
EGFR IF NONAFRICN AM: 78 ML/MIN/1.73M2
EOSINOPHILS: 5 %
GLOBULIN: 2.5 G/DL (CALC) (ref 1.9–3.7)
GLUCOSE: 91 MG/DL (ref 65–99)
GLUCOSE: NEGATIVE
HDL CHOLESTEROL: 73 MG/DL
HEMATOCRIT: 42.3 % (ref 38.5–50)
HEMOGLOBIN: 14.1 G/DL (ref 13.2–17.1)
KETONES: NEGATIVE
LDL-CHOLESTEROL: 94 MG/DL (CALC)
LEUKOCYTE ESTERASE: NEGATIVE
LYMPHOCYTES: 23.8 %
MCH: 29.4 PG (ref 27–33)
MCHC: 33.3 G/DL (ref 32–36)
MCV: 88.3 FL (ref 80–100)
MONOCYTES: 8.2 %
MPV: 11.4 FL (ref 7.5–12.5)
NEUTROPHILS: 62.1 %
NITRITE: NEGATIVE
NON-HDL CHOLESTEROL: 109 MG/DL (CALC)
OCCULT BLOOD: NEGATIVE
PH: 7 (ref 5–8)
PLATELET COUNT: 197 THOUSAND/UL (ref 140–400)
POTASSIUM: 5 MMOL/L (ref 3.5–5.3)
PROTEIN, TOTAL: 6.9 G/DL (ref 6.1–8.1)
PROTEIN: NEGATIVE
PSA, TOTAL: 0.6 NG/ML
RDW: 12.2 % (ref 11–15)
RED BLOOD CELL COUNT: 4.79 MILLION/UL (ref 4.2–5.8)
SODIUM: 136 MMOL/L (ref 135–146)
SPECIFIC GRAVITY: 1.01 (ref 1–1.03)
TRIGLYCERIDES: 68 MG/DL
WHITE BLOOD CELL COUNT: 5.6 THOUSAND/UL (ref 3.8–10.8)

## 2021-10-06 ENCOUNTER — OFFICE VISIT (OUTPATIENT)
Dept: NEPHROLOGY | Facility: CLINIC | Age: 61
End: 2021-10-06
Payer: COMMERCIAL

## 2021-10-06 VITALS
SYSTOLIC BLOOD PRESSURE: 95 MMHG | WEIGHT: 176 LBS | DIASTOLIC BLOOD PRESSURE: 57 MMHG | BODY MASS INDEX: 20.78 KG/M2 | HEIGHT: 77 IN | HEART RATE: 62 BPM

## 2021-10-06 DIAGNOSIS — Z00.00 ROUTINE GENERAL MEDICAL EXAMINATION AT A HEALTH CARE FACILITY: Primary | ICD-10-CM

## 2021-10-06 DIAGNOSIS — H93.13 TINNITUS OF BOTH EARS: ICD-10-CM

## 2021-10-06 PROCEDURE — 99396 PREV VISIT EST AGE 40-64: CPT | Performed by: INTERNAL MEDICINE

## 2021-10-06 PROCEDURE — 3078F DIAST BP <80 MM HG: CPT | Performed by: INTERNAL MEDICINE

## 2021-10-06 PROCEDURE — 3074F SYST BP LT 130 MM HG: CPT | Performed by: INTERNAL MEDICINE

## 2021-10-06 PROCEDURE — 3008F BODY MASS INDEX DOCD: CPT | Performed by: INTERNAL MEDICINE

## 2021-10-07 NOTE — PROGRESS NOTES
[facility], [facility]  Nephrology Daily Progress Note    Valentin Israel  [id number]  63year old  Patient presents with: Follow - Up: Follow up regular Annual check up      HPI:   Valentin Israel is a 63year old male.   53-year-old male with a history of borderlin alert and responsive no acute distress noted  Neck/Thyroid: neck is supple without adenopathy. Tympanic membranes benign.   No cerumen  Lymphatic: no abnormal cervical, supraclavicular or axillary adenopathy is noted  Respiratory: normal to inspection lung glaucoma in mother      Overall the patient is doing well. Reviewed recent labs from August 25, 2021. Overall acceptable. Cholesterol 182 with an LDL of 94. See yearly or as needed. Referred to ENT for evaluation of tinnitus.          No orders of the

## 2021-10-08 ENCOUNTER — OFFICE VISIT (OUTPATIENT)
Dept: DERMATOLOGY CLINIC | Facility: CLINIC | Age: 61
End: 2021-10-08
Payer: COMMERCIAL

## 2021-10-08 DIAGNOSIS — D23.4 BENIGN NEOPLASM OF SCALP AND SKIN OF NECK: ICD-10-CM

## 2021-10-08 DIAGNOSIS — L81.4 LENTIGO: ICD-10-CM

## 2021-10-08 DIAGNOSIS — D22.9 MULTIPLE NEVI: Primary | ICD-10-CM

## 2021-10-08 DIAGNOSIS — D23.5 BENIGN NEOPLASM OF SKIN OF TRUNK, EXCEPT SCROTUM: ICD-10-CM

## 2021-10-08 DIAGNOSIS — L82.1 SEBORRHEIC KERATOSES: ICD-10-CM

## 2021-10-08 DIAGNOSIS — D23.30 BENIGN NEOPLASM OF SKIN OF FACE: ICD-10-CM

## 2021-10-08 DIAGNOSIS — D23.60 BENIGN NEOPLASM OF SKIN OF UPPER LIMB, INCLUDING SHOULDER, UNSPECIFIED LATERALITY: ICD-10-CM

## 2021-10-08 DIAGNOSIS — D23.70 BENIGN NEOPLASM OF SKIN OF LOWER LIMB, INCLUDING HIP, UNSPECIFIED LATERALITY: ICD-10-CM

## 2021-10-08 PROCEDURE — 99213 OFFICE O/P EST LOW 20 MIN: CPT | Performed by: DERMATOLOGY

## 2021-10-18 NOTE — PROGRESS NOTES
Skip Ramesh is a 63year old male. HPI:     CC:  Patient presents with:  Full Skin Exam: Family hx of melaona. LOV 10/30/19. Pt presents for full body exam. No new conerns. Allergies:  Patient has no known allergies.     HISTORY:    Past [employer] RELEASE       Social History    Socioeconomic History      Marital status:       Spouse name: Not on file      Number of children: Not on file      Years of education: Not on file      Highest education level: Not on file    Occupational History and Family: Not on file      Attends Taoism Services: Not on file      Active Member of Clubs or Organizations: Not on file      Attends Club or Organization Meetings: Not on file      Marital Status: Not on file  Intimate Partner Violence:       Fear o face,nails, hair, external eyes, including conjunctival mucosa, eyelids, lips external ears, back, chest,/ breasts, axillae,  abdomen, arms, legs, palms.      Multiple light to medium brown, well marginated, uniformly pigmented, macules and papules 6 mm and brows. Patient with history of steroid-induced rosacea, facial steroid addiction overall stable. Continue moisturizers, consider additional topicals will treat cautiously with topicals. Consider systemic doxy if more inflammatory component worsening.   Aw

## 2021-12-29 ENCOUNTER — TELEPHONE (OUTPATIENT)
Dept: DERMATOLOGY CLINIC | Facility: CLINIC | Age: 61
End: 2021-12-29

## 2021-12-30 RX ORDER — EFINACONAZOLE 100 MG/ML
1 SOLUTION TOPICAL DAILY
Qty: 1 EACH | Refills: 3 | Status: SHIPPED | OUTPATIENT
Start: 2021-12-30 | End: 2022-01-05

## 2022-01-05 RX ORDER — EFINACONAZOLE 100 MG/ML
1 SOLUTION TOPICAL DAILY
Qty: 1 EACH | Refills: 3 | Status: SHIPPED | OUTPATIENT
Start: 2022-01-05 | End: 2022-01-11

## 2022-01-11 NOTE — TELEPHONE ENCOUNTER
Patient called    Asking to now have Jublia to be sent to [facility] in [place]. Updated pharmacy information.

## 2022-09-07 ENCOUNTER — OFFICE VISIT (OUTPATIENT)
Dept: INTERNAL MEDICINE CLINIC | Facility: CLINIC | Age: 62
End: 2022-09-07
Payer: COMMERCIAL

## 2022-09-07 VITALS
SYSTOLIC BLOOD PRESSURE: 102 MMHG | HEART RATE: 87 BPM | OXYGEN SATURATION: 98 % | DIASTOLIC BLOOD PRESSURE: 76 MMHG | TEMPERATURE: 98 F | HEIGHT: 77 IN | BODY MASS INDEX: 20.99 KG/M2 | WEIGHT: 177.81 LBS

## 2022-09-07 DIAGNOSIS — N52.9 ERECTILE DYSFUNCTION, UNSPECIFIED ERECTILE DYSFUNCTION TYPE: ICD-10-CM

## 2022-09-07 DIAGNOSIS — Z00.00 HEALTH MAINTENANCE EXAMINATION: Primary | ICD-10-CM

## 2022-09-07 DIAGNOSIS — R25.1 TREMOR: ICD-10-CM

## 2022-09-07 DIAGNOSIS — H61.92 LESION OF EXTERNAL EAR CANAL, LEFT: ICD-10-CM

## 2022-09-07 DIAGNOSIS — H93.13 TINNITUS OF BOTH EARS: ICD-10-CM

## 2022-09-07 DIAGNOSIS — D22.9 MULTIPLE NEVI: ICD-10-CM

## 2022-09-07 PROBLEM — L81.4 LENTIGO: Status: ACTIVE | Noted: 2022-09-07

## 2022-09-07 PROBLEM — L82.1 SEBORRHEIC KERATOSIS: Status: ACTIVE | Noted: 2022-09-07

## 2022-09-07 PROCEDURE — 3008F BODY MASS INDEX DOCD: CPT | Performed by: FAMILY MEDICINE

## 2022-09-07 PROCEDURE — 3074F SYST BP LT 130 MM HG: CPT | Performed by: FAMILY MEDICINE

## 2022-09-07 PROCEDURE — 99386 PREV VISIT NEW AGE 40-64: CPT | Performed by: FAMILY MEDICINE

## 2022-09-07 PROCEDURE — 90715 TDAP VACCINE 7 YRS/> IM: CPT | Performed by: FAMILY MEDICINE

## 2022-09-07 PROCEDURE — 90471 IMMUNIZATION ADMIN: CPT | Performed by: FAMILY MEDICINE

## 2022-09-07 PROCEDURE — 3078F DIAST BP <80 MM HG: CPT | Performed by: FAMILY MEDICINE

## 2022-09-07 RX ORDER — SILDENAFIL 50 MG/1
50 TABLET, FILM COATED ORAL
Qty: 10 TABLET | Refills: 5 | Status: SHIPPED | OUTPATIENT
Start: 2022-09-07

## 2022-09-07 NOTE — ASSESSMENT & PLAN NOTE
Multiple nevi seen on examination. History of seborrheic keratosis, cherry hemangioma, lentigo, multiple nevi. Continue to follow with dermatology for routine skin examination.

## 2022-09-07 NOTE — ASSESSMENT & PLAN NOTE
Patient with history of tinnitus and also small papules seen on the left ear canal.  Referral to ENT to further evaluate.

## 2022-09-07 NOTE — ASSESSMENT & PLAN NOTE
Patient with noticeable head tremor, ongoing for the past 6 months. Seems to have a possible mild hand tremor as well. Will check CBC, CMP, TSH. Referral to neurology to further evaluate.

## 2022-09-08 LAB
ABSOLUTE BASOPHILS: 49 CELLS/UL (ref 0–200)
ABSOLUTE EOSINOPHILS: 284 CELLS/UL (ref 15–500)
ABSOLUTE LYMPHOCYTES: 1555 CELLS/UL (ref 850–3900)
ABSOLUTE MONOCYTES: 551 CELLS/UL (ref 200–950)
ABSOLUTE NEUTROPHILS: 5662 CELLS/UL (ref 1500–7800)
ALBUMIN/GLOBULIN RATIO: 1.9 (CALC) (ref 1–2.5)
ALBUMIN: 4.7 G/DL (ref 3.6–5.1)
ALKALINE PHOSPHATASE: 69 U/L (ref 35–144)
ALT: 12 U/L (ref 9–46)
AST: 14 U/L (ref 10–35)
BASOPHILS: 0.6 %
BILIRUBIN, TOTAL: 0.5 MG/DL (ref 0.2–1.2)
BUN: 14 MG/DL (ref 7–25)
CALCIUM: 9.7 MG/DL (ref 8.6–10.3)
CARBON DIOXIDE: 26 MMOL/L (ref 20–32)
CHLORIDE: 101 MMOL/L (ref 98–110)
CHOL/HDLC RATIO: 3 (CALC)
CHOLESTEROL, TOTAL: 192 MG/DL
CREATININE: 1.01 MG/DL (ref 0.7–1.35)
EGFR: 85 ML/MIN/1.73M2
EOSINOPHILS: 3.5 %
GLOBULIN: 2.5 G/DL (CALC) (ref 1.9–3.7)
GLUCOSE: 90 MG/DL (ref 65–99)
HDL CHOLESTEROL: 63 MG/DL
HEMATOCRIT: 42 % (ref 38.5–50)
HEMOGLOBIN A1C: 5.4 % OF TOTAL HGB
HEMOGLOBIN: 14.4 G/DL (ref 13.2–17.1)
LDL-CHOLESTEROL: 110 MG/DL (CALC)
LYMPHOCYTES: 19.2 %
MCH: 29.9 PG (ref 27–33)
MCHC: 34.3 G/DL (ref 32–36)
MCV: 87.3 FL (ref 80–100)
MONOCYTES: 6.8 %
MPV: 11.4 FL (ref 7.5–12.5)
NEUTROPHILS: 69.9 %
NON-HDL CHOLESTEROL: 129 MG/DL (CALC)
PLATELET COUNT: 226 THOUSAND/UL (ref 140–400)
POTASSIUM: 4.1 MMOL/L (ref 3.5–5.3)
PROTEIN, TOTAL: 7.2 G/DL (ref 6.1–8.1)
RDW: 12.4 % (ref 11–15)
RED BLOOD CELL COUNT: 4.81 MILLION/UL (ref 4.2–5.8)
SIGNAL TO CUT-OFF: 0.16
SODIUM: 136 MMOL/L (ref 135–146)
TRIGLYCERIDES: 98 MG/DL
TSH W/REFLEX TO FT4: 1.95 MIU/L (ref 0.4–4.5)
WHITE BLOOD CELL COUNT: 8.1 THOUSAND/UL (ref 3.8–10.8)

## 2022-09-14 ENCOUNTER — TELEPHONE (OUTPATIENT)
Dept: GASTROENTEROLOGY | Facility: CLINIC | Age: 62
End: 2022-09-14

## 2022-09-14 DIAGNOSIS — Z86.010 PERSONAL HISTORY OF COLONIC POLYPS: ICD-10-CM

## 2022-09-14 DIAGNOSIS — Z80.0 FAMILY HISTORY OF COLON CANCER: Primary | ICD-10-CM

## 2022-09-14 NOTE — TELEPHONE ENCOUNTER
The patient would be due for a screening/surveillance colonoscopy for a family history of colon cancer/personal history of colon polyps around April 2023. May schedule following a split dose Suprep and monitored anesthesia care.

## 2022-09-14 NOTE — TELEPHONE ENCOUNTER
Dr. Gayle Pedraza--    Patient is due for colonoscopy 5 year recall. Attached previous operative and pathology report below. Reconciled medications, preferred pharmacy and allergies. No current GI related symptoms. Last Procedure, Date, MD: Colonoscopy 4/13/2018   Last Diagnosis: Small transverse colon polyp; mild sigmoid colon diverticulosis     Recalled for (mth/yrs): 5 years   Sedation used previously: IV     Last Prep Used (if known): Suprep    Quality of prep (if known): Good   Anticoagulants: No   Diabetic Meds: No   BP meds(Ace inhibitors/ARB's): No   Weight loss meds (phentermine/vyvanse): No   Iron supplement (RX/OTC): No   Height & Weight/BMI: 6'5\"; 180 lb; BMI: 21.3   Hx of Cardiac/CVA issues/(MI/Stroke): No   Devices Pacemaker/Defibrillator/Stents: No   Resp. Issues/Oxygen Use/[place]/COPD: No   Issues w/Anesthesia: No     Special comments/notes: None     Please advise on orders and prep.      Thank you

## 2022-09-21 ENCOUNTER — OFFICE VISIT (OUTPATIENT)
Dept: AUDIOLOGY | Facility: CLINIC | Age: 62
End: 2022-09-21

## 2022-09-21 ENCOUNTER — OFFICE VISIT (OUTPATIENT)
Dept: OTOLARYNGOLOGY | Facility: CLINIC | Age: 62
End: 2022-09-21

## 2022-09-21 VITALS — BODY MASS INDEX: 20.9 KG/M2 | HEIGHT: 77 IN | WEIGHT: 177 LBS

## 2022-09-21 DIAGNOSIS — H90.3 SENSORINEURAL HEARING LOSS (SNHL) OF BOTH EARS: Primary | ICD-10-CM

## 2022-09-21 DIAGNOSIS — H93.13 TINNITUS OF BOTH EARS: ICD-10-CM

## 2022-09-21 DIAGNOSIS — H61.92 LESION OF EXTERNAL EAR CANAL, LEFT: ICD-10-CM

## 2022-09-21 DIAGNOSIS — H90.3 SENSORINEURAL HEARING LOSS, BILATERAL: ICD-10-CM

## 2022-09-21 DIAGNOSIS — H93.13 TINNITUS, BILATERAL: ICD-10-CM

## 2022-09-21 PROCEDURE — 92504 EAR MICROSCOPY EXAMINATION: CPT | Performed by: STUDENT IN AN ORGANIZED HEALTH CARE EDUCATION/TRAINING PROGRAM

## 2022-09-21 PROCEDURE — 92567 TYMPANOMETRY: CPT | Performed by: AUDIOLOGIST

## 2022-09-21 PROCEDURE — 3008F BODY MASS INDEX DOCD: CPT | Performed by: STUDENT IN AN ORGANIZED HEALTH CARE EDUCATION/TRAINING PROGRAM

## 2022-09-21 PROCEDURE — 92557 COMPREHENSIVE HEARING TEST: CPT | Performed by: AUDIOLOGIST

## 2022-09-21 PROCEDURE — 99203 OFFICE O/P NEW LOW 30 MIN: CPT | Performed by: STUDENT IN AN ORGANIZED HEALTH CARE EDUCATION/TRAINING PROGRAM

## 2022-10-31 NOTE — TELEPHONE ENCOUNTER
Scheduled for:  Colonoscopy [id number]  Provider Name:  Dr. Aric Pérez  Date:  4/28/2023  Location:  [facility]  Sedation:  MAC  Time:  8:15am, pt aware [facility] will call with arrival  Prep:  Suprep  Meds/Allergies Reconciled?:  Physician reviewed    Diagnosis with codes:  Hx of Colono Polyps Z86.010, Fam Hx of Colon Cancer Z80.0  Was patient informed to call insurance with codes (Y/N): Yes, I confirmed [facility] insurance with this patient. Referral sent?:  Referral was sent at the time of electronic surgical scheduling. [facility] or [address] notified?:  Electronic case request was sent to [facility] via [employer]. Medication Orders:  n/a  Misc Orders:  Patient was informed that they will need a COVID 19 test prior to their procedure. Patient verbally understood & will await a phone call from [facility] to schedule.      Further instructions given by staff:  I discussed the prep instructions with the patient which he verbally understood and is aware that I will send the instructions via [employer]

## 2022-12-07 ENCOUNTER — OFFICE VISIT (OUTPATIENT)
Dept: DERMATOLOGY CLINIC | Facility: CLINIC | Age: 62
End: 2022-12-07
Payer: COMMERCIAL

## 2022-12-07 DIAGNOSIS — Z12.83 SKIN CANCER SCREENING: ICD-10-CM

## 2022-12-07 DIAGNOSIS — D23.4 BENIGN NEOPLASM OF SCALP AND SKIN OF NECK: ICD-10-CM

## 2022-12-07 DIAGNOSIS — L82.0 INFLAMED SEBORRHEIC KERATOSIS: ICD-10-CM

## 2022-12-07 DIAGNOSIS — D23.60 BENIGN NEOPLASM OF SKIN OF UPPER LIMB, INCLUDING SHOULDER, UNSPECIFIED LATERALITY: ICD-10-CM

## 2022-12-07 DIAGNOSIS — D22.9 MULTIPLE NEVI: Primary | ICD-10-CM

## 2022-12-07 DIAGNOSIS — L82.1 SEBORRHEIC KERATOSES: ICD-10-CM

## 2022-12-07 DIAGNOSIS — D23.30 BENIGN NEOPLASM OF SKIN OF FACE: ICD-10-CM

## 2022-12-07 DIAGNOSIS — L81.4 LENTIGO: ICD-10-CM

## 2022-12-07 DIAGNOSIS — D23.5 BENIGN NEOPLASM OF SKIN OF TRUNK, EXCEPT SCROTUM: ICD-10-CM

## 2022-12-07 DIAGNOSIS — D23.70 BENIGN NEOPLASM OF SKIN OF LOWER LIMB, INCLUDING HIP, UNSPECIFIED LATERALITY: ICD-10-CM

## 2022-12-07 PROCEDURE — 99214 OFFICE O/P EST MOD 30 MIN: CPT | Performed by: DERMATOLOGY

## 2022-12-19 ENCOUNTER — OFFICE VISIT (OUTPATIENT)
Dept: INTERNAL MEDICINE CLINIC | Facility: CLINIC | Age: 62
End: 2022-12-19
Payer: COMMERCIAL

## 2022-12-19 VITALS
HEART RATE: 72 BPM | OXYGEN SATURATION: 98 % | HEIGHT: 77 IN | SYSTOLIC BLOOD PRESSURE: 110 MMHG | WEIGHT: 174 LBS | BODY MASS INDEX: 20.55 KG/M2 | DIASTOLIC BLOOD PRESSURE: 64 MMHG | RESPIRATION RATE: 16 BRPM

## 2022-12-19 DIAGNOSIS — N48.9 PENILE LESION: Primary | ICD-10-CM

## 2022-12-19 DIAGNOSIS — H61.92 LESION OF EXTERNAL EAR CANAL, LEFT: ICD-10-CM

## 2022-12-19 DIAGNOSIS — N52.9 ERECTILE DYSFUNCTION, UNSPECIFIED ERECTILE DYSFUNCTION TYPE: ICD-10-CM

## 2022-12-19 DIAGNOSIS — H93.13 TINNITUS OF BOTH EARS: ICD-10-CM

## 2022-12-19 DIAGNOSIS — H90.3 SENSORINEURAL HEARING LOSS (SNHL) OF BOTH EARS: ICD-10-CM

## 2022-12-19 DIAGNOSIS — R25.1 TREMOR: ICD-10-CM

## 2022-12-19 PROCEDURE — 3008F BODY MASS INDEX DOCD: CPT | Performed by: FAMILY MEDICINE

## 2022-12-19 PROCEDURE — 3074F SYST BP LT 130 MM HG: CPT | Performed by: FAMILY MEDICINE

## 2022-12-19 PROCEDURE — 99214 OFFICE O/P EST MOD 30 MIN: CPT | Performed by: FAMILY MEDICINE

## 2022-12-19 PROCEDURE — 3078F DIAST BP <80 MM HG: CPT | Performed by: FAMILY MEDICINE

## 2022-12-19 NOTE — PATIENT INSTRUCTIONS
PATIENT INSTRUCTIONS    Thank you for seeing me today, it was a pleasure taking care of you. Please check out at the  and schedule a follow up appointment. Return if symptoms worsen or fail to improve.   Can see urologist to evaluate your penile lesion  Continue to see specialists    Best,  Dr. Rommel Powell

## 2022-12-19 NOTE — ASSESSMENT & PLAN NOTE
Patient with a region of slight firmness in the dorsal aspect of his penis. He notes that he intermittently will have a curvature of his penis. Possibly related to Peyronie's disease. Reports that the lesion has been stable for several months. Not significantly bothering him at this time. Will refer to urology to further evaluate at this time.

## 2023-01-01 NOTE — TELEPHONE ENCOUNTER
"[facility]  Neonatology  Progress Note    Patient Name: Sreedhar Max  MRN: [id number]  Admission Date: 2023  Hospital Length of Stay: 12 days  Attending Physician: Mary Haile MD    At Birth Gestational Age: 35w0d  Day of Life: 12 days  Corrected Gestational Age 36w 5d  Chronological Age: 12 days    Subjective:     Interval History: No acute events overnight. No A/B/D events overnight.  Tolerating full PO:NG enteral feeds. Currently on Room air. Improved Po intake.    Scheduled Meds:   pediatric multivitamin with iron  1 mL Oral Daily     Continuous Infusions:  PRN Meds:    Nutritional Support: Enteral: Similac  Special Care 24 KCal High Protein    Objective:     Vital Signs (Most Recent):  Temp: 98.3 °F (36.8 °C) (11/12/23 0800)  Pulse: 141 (11/12/23 0900)  Resp: 56 (11/12/23 0900)  BP: (!) 67/32 (11/12/23 0800)  SpO2: 96 % (11/12/23 0900) Vital Signs (24h Range):  Temp:  [98.3 °F (36.8 °C)-98.8 °F (37.1 °C)] 98.3 °F (36.8 °C)  Pulse:  [141-178] 141  Resp:  [30-73] 56  SpO2:  [92 %-99 %] 96 %  BP: (67-70)/(32-33) 67/32     Anthropometrics:  Head Circumference: 33.8 cm  Weight: 2455 g (5 lb 6.6 oz) 18 %ile (Z= -0.92) based on Sparks (Boys, 22-50 Weeks) weight-for-age data using vitals from 2023.  Weight change: 20 g (0.7 oz)  Height: 45.5 cm (17.91") 42 %ile (Z= -0.20) based on Case (Boys, 22-50 Weeks) Length-for-age data based on Length recorded on 2023.    Intake/Output - Last 3 Shifts         11/10 0700  11/11 0659 11/11 0700  11/12 0659 11/12 0700  11/13 0659    P.O. 271 279 27    NG/ 121 23    Total Intake(mL/kg) 400 (164.3) 400 (162.9) 50 (20.4)    Net +400 +400 +50           Urine Occurrence 8 x 8 x 1 x    Stool Occurrence 7 x 6 x 1 x    Emesis Occurrence  0 x              Physical Exam  Vitals and nursing note reviewed.   Constitutional:       General: He is active.   HENT:      Head: Normocephalic. Anterior fontanelle is flat.      Nose: Nose normal.      " Rescheduled for:  Colonoscopy 53175  Provider Name: Dr. Dhruv Hernandes  Date:    From-1/19/18  To-4/13/18  Location:  [facility]  Sedation:  IV  Time:    From-?   To-0830 (pt is aware that [facility] will call the day before to confirm arrival time)   Prep:  Suprep, mailed new instr Comments: NG in place     Mouth/Throat:      Mouth: Mucous membranes are moist.      Pharynx: Oropharynx is clear.   Eyes:      Conjunctiva/sclera: Conjunctivae normal.   Cardiovascular:      Rate and Rhythm: Normal rate and regular rhythm.      Pulses: Normal pulses.      Heart sounds: No murmur heard.  Pulmonary:      Effort: Pulmonary effort is normal.      Breath sounds: Normal breath sounds.   Abdominal:      General: Abdomen is flat. There is no distension.      Palpations: Abdomen is soft.   Genitourinary:     Penis: Normal and uncircumcised.       Testes: Normal.      Rectum: Normal.   Musculoskeletal:         General: No deformity.      Cervical back: Neck supple.   Skin:     General: Skin is warm and dry.   Neurological:      General: No focal deficit present.      Mental Status: He is alert.      Primitive Reflexes: Suck normal. Symmetric Reji.                Lines/Drains:  Lines/Drains/Airways       Drain  Duration                  NG/OG Tube 11/02/23 2000 nasogastric 5 Fr. Right nostril 9 days                      Laboratory:  No results found for this or any previous visit (from the past 24 hour(s)).     Diagnostic Results:  None new      Assessment/Plan:     Endocrine  Alteration in nutrition  Comments:  Mother does not want to provide breast milk. Infant is on feeds of Neosure 22. Took 161 ml/kg/d with 128 kcal/kg/d Weight change: 20 g (0.7 oz) and is now -1% below BW. Normal voids and stools. Working on nippling and took improved 70% by mouth. Is on multivitamin supplementation.     Plan:  - Continue feeds at 160 ml/kg/day based on BW= 50ml Q3  - change to SSC24 HP due to lack of appropriate weight gain ( per nurtrition recs)  - Will nipple as tolerated with cues  - continue pediatric MVI +Fe    Obstetric  * Prematurity, 2,000-2,499 grams, 35-36 completed weeks  COMMENTS:  Is 12 days, 36w 5d corrected weeks infant. Stable temperatures in crib. Urine CMV is negative.     PLANS:  - Continue  developmental supportive care      Other  Healthcare maintenance  SOCIAL COMMENTS:  Mother updated in delivery room and shown infant prior to transfer to NICU  11/2: Parents updated at bedside on plan of care (OU)  11/3: Parents updated at bedside on plan of care (OU)  11/5: Mother visiting and updated at bedside by NNP  11/8, 11/10: mother updated via phone with progress and discharge requirements- she desires circ in hospital ( HDO)    SCREENING PLANS:  Hearing screen  Care seat test  CCHD    COMPLETED:  11/3 NBS: pending    IMMUNIZATIONS:  Hepatitis B due (information given, awaiting parental consent)  11/4: mother states she does not want immunization at this time          Mary Haile MD  Neonatology  [facility]

## 2023-01-03 ENCOUNTER — LAB ENCOUNTER (OUTPATIENT)
Dept: LAB | Facility: HOSPITAL | Age: 63
End: 2023-01-03
Attending: Other
Payer: COMMERCIAL

## 2023-01-03 ENCOUNTER — OFFICE VISIT (OUTPATIENT)
Dept: NEUROLOGY | Facility: CLINIC | Age: 63
End: 2023-01-03
Payer: COMMERCIAL

## 2023-01-03 VITALS — DIASTOLIC BLOOD PRESSURE: 72 MMHG | OXYGEN SATURATION: 98 % | SYSTOLIC BLOOD PRESSURE: 114 MMHG | HEART RATE: 72 BPM

## 2023-01-03 DIAGNOSIS — G25.0 ESSENTIAL TREMOR: Primary | ICD-10-CM

## 2023-01-03 PROCEDURE — 82390 ASSAY OF CERULOPLASMIN: CPT | Performed by: OTHER

## 2023-01-03 PROCEDURE — 3078F DIAST BP <80 MM HG: CPT | Performed by: OTHER

## 2023-01-03 PROCEDURE — 99213 OFFICE O/P EST LOW 20 MIN: CPT | Performed by: OTHER

## 2023-01-03 PROCEDURE — 3074F SYST BP LT 130 MM HG: CPT | Performed by: OTHER

## 2023-01-03 PROCEDURE — 36415 COLL VENOUS BLD VENIPUNCTURE: CPT | Performed by: OTHER

## 2023-01-03 NOTE — PATIENT INSTRUCTIONS
-Please obtain laboratory blood work   -We have ordered MRI brain   -Follow up in 6 months or sooner if needed. -If you develop sudden onset loss of vision, double vision, room spinning/world spinning sensation, inability to speak or understand others who are speaking to you, slurred speech, balance problems, weakness on one side of your body, numbness on one side of your body or worst headache you ever had, please seek out emergency medical attention via 911 for the nearest emergency room to be evaluated for possible stroke. -MyChart or call office with any questions or concerns. Thank you for coming to see me today. The easiest way to communicate with me is via Asterest. Asterest is meant for simple questions regarding medications, possible side effects, or other simple straight forward questions in limited sentences, rather than multiple paragraphs of discussion. Asterest is not meant for, or efficient for these complex questions, extensive questions, extensive medication adjustments, complex new symptoms or concerns. These issues beyond simple questions require a follow up visit with myself. Refills:  Please pay attention to when your refills will need to be renewed. Due to the volume of phone calls daily, this could potentially take a few days, although we certainly try to honor your refill requests as soon as we can. You should call at least 1 week in advance of needing a refill to ensure you do not run out of medication. Keep in mind that refill requests on Fridays may not be filled until the following week.

## 2023-01-04 LAB — CERULOPLASMIN SERPL-MCNC: 25.1 MG/DL (ref 20–60)

## 2023-04-24 ENCOUNTER — TELEPHONE (OUTPATIENT)
Facility: CLINIC | Age: 63
End: 2023-04-24

## 2023-04-24 RX ORDER — SODIUM, POTASSIUM,MAG SULFATES 17.5-3.13G
SOLUTION, RECONSTITUTED, ORAL ORAL
Qty: 1 EACH | Refills: 0 | Status: SHIPPED | OUTPATIENT
Start: 2023-04-24

## 2023-04-24 NOTE — TELEPHONE ENCOUNTER
Left message on cell phone to call back for pharmacy info. Noted medication on med review was sent to [facility].  used

## 2023-04-24 NOTE — TELEPHONE ENCOUNTER
Please see referral # [id number]. Patient's plan does not require prior authorization. It looks like the patient is trying to get the coding for their procedure in order to get benefits. Please share the procedure codes with the patient.

## 2023-04-24 NOTE — TELEPHONE ENCOUNTER
Called and spoke to the patient,  and name verified. Informed the patient of bowel prep sent to [address] [place] [address].

## 2023-04-24 NOTE — TELEPHONE ENCOUNTER
Luis Smith at [address] calling in to inform that the patient is calling them to check benefits for Colonoscopy procedure, but she will need to get a cpt code before benefits and prior authorization information can be given to the patient.

## 2023-04-24 NOTE — TELEPHONE ENCOUNTER
[employer] ([phone]). I spoke to Simone Pepper. (). She verified the question below with the  assist line, she stated they have found the CPT code.

## 2023-04-28 ENCOUNTER — SURGERY CENTER DOCUMENTATION (OUTPATIENT)
Dept: SURGERY | Age: 63
End: 2023-04-28

## 2023-04-28 ENCOUNTER — TELEPHONE (OUTPATIENT)
Facility: CLINIC | Age: 63
End: 2023-04-28

## 2023-04-28 NOTE — TELEPHONE ENCOUNTER
Health Maintenance Updated. 5 year colonoscopy recall entered into patient outreach in [address] [place] [address] Rd.   Next colonoscopy will be due 4/28/2028

## 2023-04-28 NOTE — PROCEDURES
[address] Endoscopy Report      Date of Procedure:  04/28/23      Preoperative Diagnosis:  1. Colorectal cancer screening  2. Family history of colon cancer  3. Personal history of adenomatous colon polyps      Postoperative Diagnosis:  Sigmoid colon diverticulosis      Procedure:    Colonoscopy       Surgeon:  Kulwinder Carrillo M.D. Anesthesia:  Monitored anesthesia care  Cecal withdrawal time: 20 minutes  EBL:  None      Brief History: This is a 63year old male who presents for a screening/surveillance colonoscopy in the setting of a family history of colon cancer in his mother at the age of 62 and a personal history of adenomatous colon polyps. The patient's last colonoscopy was 5 years prior with removal of a solitary subcentimeter tubular adenoma. The patient has been asymptomatic from a lower gastrointestinal tract standpoint. Technique:  After informed consent, the patient was placed in the left lateral recumbent position. Digital rectal examination revealed no palpable intraluminal abnormalities. An Olympus variable stiffness 190 series HD colonoscope was inserted into the rectum and advanced under direct vision by following the lumen to the terminal ileum. The colon was examined upon withdrawal in the left lateral recumbent position. Findings:  The preparation of the colon was good. The terminal ileum was examined for 5 cm and visually normal.  The ileocecal valve was well preserved. The visualized colonic mucosa from the cecum to the anal verge was normal with an intact vascular pattern. There were several diverticula seen in the sigmoid colon without signs of complication. There were no colonic polyps, mass lesions, vascular anomalies or signs of inflammation seen. Retroflexion in the rectum revealed no abnormalities. The procedure was well tolerated without immediate complication. Impression:  1.   Uncomplicated sigmoid colon diverticulosis  2. Otherwise normal colonoscopy to the terminal ileum    Recommendations:  1. High-fiber diet. 2.  Screening/surveillance colonoscopy in 5 years unless any new symptoms or signs are noted.         Edvin Rizzo MD  4/28/2023

## 2023-04-28 NOTE — TELEPHONE ENCOUNTER
GI RNs: Please enter in health maintenance that a colonoscopy was performed and enter colonoscopy recall for 5 years.

## 2023-05-05 ENCOUNTER — MED REC SCAN ONLY (OUTPATIENT)
Facility: CLINIC | Age: 63
End: 2023-05-05

## 2023-06-20 ENCOUNTER — OFFICE VISIT (OUTPATIENT)
Dept: SURGERY | Facility: CLINIC | Age: 63
End: 2023-06-20

## 2023-06-20 VITALS
HEIGHT: 77 IN | SYSTOLIC BLOOD PRESSURE: 134 MMHG | BODY MASS INDEX: 20.55 KG/M2 | DIASTOLIC BLOOD PRESSURE: 86 MMHG | HEART RATE: 62 BPM | WEIGHT: 174 LBS

## 2023-06-20 DIAGNOSIS — N48.6 PEYRONIE'S DISEASE: Primary | ICD-10-CM

## 2023-06-20 PROCEDURE — 3008F BODY MASS INDEX DOCD: CPT | Performed by: UROLOGY

## 2023-06-20 PROCEDURE — 3075F SYST BP GE 130 - 139MM HG: CPT | Performed by: UROLOGY

## 2023-06-20 PROCEDURE — 3079F DIAST BP 80-89 MM HG: CPT | Performed by: UROLOGY

## 2023-06-20 PROCEDURE — 99244 OFF/OP CNSLTJ NEW/EST MOD 40: CPT | Performed by: UROLOGY

## 2023-06-20 RX ORDER — PENTOXIFYLLINE 400 MG/1
400 TABLET, EXTENDED RELEASE ORAL
Qty: 90 TABLET | Refills: 3 | Status: SHIPPED | OUTPATIENT
Start: 2023-06-20

## 2023-09-06 ENCOUNTER — OFFICE VISIT (OUTPATIENT)
Dept: INTERNAL MEDICINE CLINIC | Facility: CLINIC | Age: 63
End: 2023-09-06
Payer: COMMERCIAL

## 2023-09-06 VITALS
HEART RATE: 68 BPM | OXYGEN SATURATION: 98 % | BODY MASS INDEX: 21.09 KG/M2 | TEMPERATURE: 97 F | SYSTOLIC BLOOD PRESSURE: 106 MMHG | DIASTOLIC BLOOD PRESSURE: 70 MMHG | WEIGHT: 175 LBS | HEIGHT: 76.5 IN

## 2023-09-06 DIAGNOSIS — N52.9 ERECTILE DYSFUNCTION, UNSPECIFIED ERECTILE DYSFUNCTION TYPE: ICD-10-CM

## 2023-09-06 DIAGNOSIS — Z00.00 HEALTH MAINTENANCE EXAMINATION: Primary | ICD-10-CM

## 2023-09-06 DIAGNOSIS — D22.9 MULTIPLE NEVI: ICD-10-CM

## 2023-09-06 DIAGNOSIS — H90.3 SENSORINEURAL HEARING LOSS (SNHL) OF BOTH EARS: ICD-10-CM

## 2023-09-06 DIAGNOSIS — L82.1 SEBORRHEIC KERATOSIS: ICD-10-CM

## 2023-09-06 DIAGNOSIS — L81.4 LENTIGO: ICD-10-CM

## 2023-09-06 DIAGNOSIS — G25.0 ESSENTIAL TREMOR: ICD-10-CM

## 2023-09-06 DIAGNOSIS — N48.6 PEYRONIE DISEASE: ICD-10-CM

## 2023-09-06 DIAGNOSIS — H61.92 LESION OF EXTERNAL EAR CANAL, LEFT: ICD-10-CM

## 2023-09-06 DIAGNOSIS — H93.13 TINNITUS OF BOTH EARS: ICD-10-CM

## 2023-09-06 LAB
ALBUMIN SERPL-MCNC: 4.1 G/DL (ref 3.4–5)
ALBUMIN/GLOB SERPL: 1.2 {RATIO} (ref 1–2)
ALP LIVER SERPL-CCNC: 71 U/L
ALT SERPL-CCNC: 28 U/L
ANION GAP SERPL CALC-SCNC: 8 MMOL/L (ref 0–18)
AST SERPL-CCNC: 16 U/L (ref 15–37)
BILIRUB SERPL-MCNC: 0.8 MG/DL (ref 0.1–2)
BUN BLD-MCNC: 11 MG/DL (ref 7–18)
BUN/CREAT SERPL: 10.5 (ref 10–20)
CALCIUM BLD-MCNC: 9 MG/DL (ref 8.5–10.1)
CHLORIDE SERPL-SCNC: 106 MMOL/L (ref 98–112)
CHOLEST SERPL-MCNC: 93 MG/DL (ref ?–200)
CO2 SERPL-SCNC: 25 MMOL/L (ref 21–32)
COMPLEXED PSA SERPL-MCNC: 1.02 NG/ML (ref ?–4)
CREAT BLD-MCNC: 1.05 MG/DL
EGFRCR SERPLBLD CKD-EPI 2021: 81 ML/MIN/1.73M2 (ref 60–?)
FASTING PATIENT LIPID ANSWER: NO
FASTING STATUS PATIENT QL REPORTED: NO
GLOBULIN PLAS-MCNC: 3.3 G/DL (ref 2.8–4.4)
GLUCOSE BLD-MCNC: 89 MG/DL (ref 70–99)
HDLC SERPL-MCNC: 80 MG/DL (ref 40–59)
LDLC SERPL CALC-MCNC: 3 MG/DL (ref ?–100)
NONHDLC SERPL-MCNC: 13 MG/DL (ref ?–130)
OSMOLALITY SERPL CALC.SUM OF ELEC: 287 MOSM/KG (ref 275–295)
POTASSIUM SERPL-SCNC: 3.8 MMOL/L (ref 3.5–5.1)
PROT SERPL-MCNC: 7.4 G/DL (ref 6.4–8.2)
SODIUM SERPL-SCNC: 139 MMOL/L (ref 136–145)
TRIGL SERPL-MCNC: 24 MG/DL (ref 30–149)
VLDLC SERPL CALC-MCNC: 3 MG/DL (ref 0–30)

## 2023-09-06 PROCEDURE — 3078F DIAST BP <80 MM HG: CPT | Performed by: FAMILY MEDICINE

## 2023-09-06 PROCEDURE — 80053 COMPREHEN METABOLIC PANEL: CPT | Performed by: FAMILY MEDICINE

## 2023-09-06 PROCEDURE — 3008F BODY MASS INDEX DOCD: CPT | Performed by: FAMILY MEDICINE

## 2023-09-06 PROCEDURE — 84153 ASSAY OF PSA TOTAL: CPT | Performed by: FAMILY MEDICINE

## 2023-09-06 PROCEDURE — 3074F SYST BP LT 130 MM HG: CPT | Performed by: FAMILY MEDICINE

## 2023-09-06 PROCEDURE — 99396 PREV VISIT EST AGE 40-64: CPT | Performed by: FAMILY MEDICINE

## 2023-09-06 PROCEDURE — 80061 LIPID PANEL: CPT | Performed by: FAMILY MEDICINE

## 2023-09-06 NOTE — ASSESSMENT & PLAN NOTE
Mild head tremor. Evaluated by neurology  Monitoring for now. Can consider propranolol if needed in future.

## 2023-09-06 NOTE — PATIENT INSTRUCTIONS
PATIENT INSTRUCTIONS    Thank you for seeing me today, it was a pleasure taking care of you. Please check out at the  and schedule a follow up appointment. Return in about 1 year (around 9/6/2024) for physical .  Please get your labs drawn - you may need to schedule a lab appointment if this was not completed at your recent doctor's visit. The following imaging studies were ordered: MRI head - thru neurology   Please also follow up with the following specialists: Urology, dermatology   Please fill out the advance directive information (power of  documents) and bring a copy to our clinic.   Continue focusing on healthy diet and exercise  Highly recommend getting Shingrex shingles vaccine   COVID booster   Flu shot during the fall       Nitin,   Dr. Jerrica Hobson

## 2023-10-09 ENCOUNTER — PATIENT MESSAGE (OUTPATIENT)
Dept: SURGERY | Facility: CLINIC | Age: 63
End: 2023-10-09

## 2023-10-11 ENCOUNTER — OFFICE VISIT (OUTPATIENT)
Dept: DERMATOLOGY CLINIC | Facility: CLINIC | Age: 63
End: 2023-10-11

## 2023-10-11 DIAGNOSIS — D23.30 BENIGN NEOPLASM OF SKIN OF FACE: ICD-10-CM

## 2023-10-11 DIAGNOSIS — D23.4 BENIGN NEOPLASM OF SCALP AND SKIN OF NECK: ICD-10-CM

## 2023-10-11 DIAGNOSIS — D23.5 BENIGN NEOPLASM OF SKIN OF TRUNK, EXCEPT SCROTUM: ICD-10-CM

## 2023-10-11 DIAGNOSIS — Z87.898 HISTORY OF ATYPICAL NEVUS: ICD-10-CM

## 2023-10-11 DIAGNOSIS — D22.9 MULTIPLE NEVI: Primary | ICD-10-CM

## 2023-10-11 DIAGNOSIS — L82.0 INFLAMED SEBORRHEIC KERATOSIS: ICD-10-CM

## 2023-10-11 DIAGNOSIS — D23.60 BENIGN NEOPLASM OF SKIN OF UPPER LIMB, INCLUDING SHOULDER, UNSPECIFIED LATERALITY: ICD-10-CM

## 2023-10-11 DIAGNOSIS — D23.70 BENIGN NEOPLASM OF SKIN OF LOWER LIMB, INCLUDING HIP, UNSPECIFIED LATERALITY: ICD-10-CM

## 2023-10-11 DIAGNOSIS — L82.1 SEBORRHEIC KERATOSES: ICD-10-CM

## 2023-10-11 DIAGNOSIS — L81.4 LENTIGO: ICD-10-CM

## 2023-10-11 PROCEDURE — 99214 OFFICE O/P EST MOD 30 MIN: CPT | Performed by: DERMATOLOGY

## 2023-10-11 RX ORDER — TAVABOROLE TOPICAL SOLUTION, 5% 43.5 MG/ML
SOLUTION TOPICAL
Qty: 10 ML | Refills: 2 | Status: SHIPPED | OUTPATIENT
Start: 2023-10-11

## 2023-10-22 NOTE — PROGRESS NOTES
Kelly Wolf is a 66year old male. HPI:     CC:  Patient presents with:  Full Skin Exam: LOV 12/22. Father hx of SCC. Pt presents for full body exam r/t family hx. Pt present for full body exam. Pt denies areas of concern. Allergies:  Patient has no known allergies. HISTORY:    Past Medical History:   Diagnosis Date    Blepharitis 04/17/2015    Cataracts, bilateral 2007    Essential tremor 09/07/2022    Glaucoma suspect 04/17/2015    Myopia with presbyopia of both eyes 04/17/2015    Nevus of conjunctiva- right eye nasally 04/17/2015    Ophthalmological disorder     Incresed C/D ratio, +FH Mom with COAG    Peyronie disease 12/19/2022    Pinguecula of both eyes 2007      Past Surgical History:   Procedure Laterality Date    ARTHROSCOPY, KNEE, SURGICAL; W/LATERAL RELEASE      COLONOSCOPY  April 2023    Clear, no issues    OTHER SURGICAL HISTORY  right knee scoped to remove excess cartilage    VASECTOMY        Family History   Problem Relation Age of Onset    Glaucoma Mother     Colon Cancer Mother 62    Cancer Mother         at 62years old    Hypertension Father 43        successfully treated with pills    Skin cancer Father 80        SCC to ear. Cancer Father         lung cancer at age 80    Hypertension Sister         stable    No Known Problems Brother     No Known Problems Brother     No Known Problems Maternal Grandmother     No Known Problems Maternal Grandfather     No Known Problems Paternal Grandmother     No Known Problems Paternal Grandfather     Prostate Cancer Paternal Uncle 80    Diabetes Neg     Macular degeneration Neg       Social History     Socioeconomic History    Marital status:    Tobacco Use    Smoking status: Never    Smokeless tobacco: Never   Vaping Use    Vaping Use: Never used   Substance and Sexual Activity    Alcohol use:  Yes     Alcohol/week: 6.0 standard drinks of alcohol     Types: 3 Glasses of wine, 3 Cans of beer per week     Comment: 5-6 units per week Drug use: No    Sexual activity: Yes     Partners: Female     Birth control/protection: Vasectomy   Other Topics Concern    Grew up on a farm No    History of tanning Yes     Comment: Occasionally over life time    Outdoor occupation No    Pt has a pacemaker No    Pt has a defibrillator No    Reaction to local anesthetic No    Caffeine Concern Yes     Comment: coffee, 1 cup daily   Social History Narrative    Relationships:  - Nina    Children: 3 daughters - Anthony Alves (all grown)    Pets: [address]: N/A    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 Starting to think about retiring. Origin: Grew up in [place]. Interests: Works out [employer], swims, bike rides, golf. Very active. Spending time with family. Spiritual: Spiritual - very much so. Has changed his attitude towards life. Goes to [employer] as well. Current Outpatient Medications   Medication Sig Dispense Refill    Tavaborole (KERYDIN) 5 % External Solution Use qd to nails as directed 10 mL 2    pentoxifylline  MG Oral Tab CR Take 1 tablet (400 mg total) by mouth 3 (three) times daily with meals. 90 tablet 3    Sildenafil Citrate (VIAGRA) 50 MG Oral Tab Take 1 tablet (50 mg total) by mouth daily as needed for Erectile Dysfunction.  10 tablet 5     Allergies:   No Known Allergies    Past Medical History:   Diagnosis Date    Blepharitis 04/17/2015    Cataracts, bilateral 2007    Essential tremor 09/07/2022    Glaucoma suspect 04/17/2015    Myopia with presbyopia of both eyes 04/17/2015    Nevus of conjunctiva- right eye nasally 04/17/2015    Ophthalmological disorder     Incresed C/D ratio, +FH Mom with COAG    Peyronie disease 12/19/2022    Pinguecula of both eyes 2007     Past Surgical History:   Procedure Laterality Date    ARTHROSCOPY, KNEE, SURGICAL; W/LATERAL RELEASE      COLONOSCOPY  April 2023    Clear, no issues    OTHER SURGICAL HISTORY  right knee scoped to remove excess cartilage    VASECTOMY       Social History    Socioeconomic History      Marital status:       Spouse name: Not on file      Number of children: Not on file      Years of education: Not on file      Highest education level: Not on file    Occupational History      Not on file    Tobacco Use      Smoking status: Never      Smokeless tobacco: Never    Vaping Use      Vaping Use: Never used    Substance and Sexual Activity      Alcohol use: Yes        Alcohol/week: 6.0 standard drinks of alcohol        Types: 3 Glasses of wine, 3 Cans of beer per week        Comment: 5-6 units per week      Drug use: No      Sexual activity: Yes        Partners: Female        Birth control/protection: Vasectomy    Other Topics      Concerns:        Grew up on a farm: No        History of tanning: Yes          Occasionally over life time        Outdoor occupation: No        Pt has a pacemaker: No        Pt has a defibrillator: No        Reaction to local anesthetic: No         Service: Not Asked        Blood Transfusions: Not Asked        Caffeine Concern: Yes          coffee, 1 cup daily        Occupational Exposure: Not Asked        Hobby Hazards: Not Asked        Sleep Concern: Not Asked        Stress Concern: Not Asked        Weight Concern: Not Asked        Special Diet: Not Asked        Back Care: Not Asked        Exercise: Not Asked        Bike Helmet: Not Asked        Seat Belt: Not Asked        Self-Exams: Not Asked    Social History Narrative      Relationships:  - Nnia      Children: 3 daughters - Tracie Valdes, Eduardo Riser (all grown)      Pets: [address]: N/A      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 Origin: Grew up in [facility] [place]. Interests: Works out [employer], swims, bike rides, golf. Very active.  Spending time with family. Spiritual: Spiritual - very much so. Has changed his attitude towards life. Goes to [employer] as well. Social Determinants of Health  Financial Resource Strain: Not on file  Food Insecurity: Not on file  Transportation Needs: Not on file  Physical Activity: Not on file  Stress: Not on file  Social Connections: Not on file  Housing Stability: Not on file  Family History   Problem Relation Age of Onset    Glaucoma Mother     Colon Cancer Mother 62    Cancer Mother         at 62years old    Hypertension Father 43        successfully treated with pills    Skin cancer Father 80        SCC to ear. Cancer Father         lung cancer at age 80    Hypertension Sister         stable    No Known Problems Brother     No Known Problems Brother     No Known Problems Maternal Grandmother     No Known Problems Maternal Grandfather     No Known Problems Paternal Grandmother     No Known Problems Paternal Grandfather     Prostate Cancer Paternal Uncle 80    Diabetes Neg     Macular degeneration Neg        There were no vitals filed for this visit. HPI:    Patient presents with:  Full Skin Exam: LOV 12/22. Father hx of SCC. Pt presents for full body exam r/t family hx. Pt present for full body exam. Pt denies areas of concern. Follow-up extensive pigmented lesions, history of atypical nevus    Has been careful to  use sunscreen. Family history of skin cancer as noted father with history of SCC previously patient with history remote of clinically atypical nevi, numerous pigmented lesions. No particular changes as 1st patient aware    Patient presents with concerns above. Past notes/ records and appropriate/relevant lab results including pathology and past body maps reviewed. Updated and new information noted in current visit. Patient has been in their usual state of health. History, medications, allergies reviewed as noted. ROS:  Denies any other systemic complaints.   No new or changeing lesions other than noted above. No fevers, chills, night sweats, unusual sun sensitivity. No other skin complaints. History, medications, allergies reviewed as noted. Physical Examination:     Well-developed well-nourished patient alert oriented in no acute distress. Exam total-body performed, including scalp, head, neck, face,nails, hair, external eyes, including conjunctival mucosa, eyelids, lips external ears, back, chest,/ breasts, axillae,  abdomen, arms, legs, palms. Multiple light to medium brown, well marginated, uniformly pigmented, macules and papules 6 mm and less scattered on exam. pigmented lesions examined with dermoscopy benign-appearing patterns. Waxy tannish keratotic papules scattered, cherry-red vascular papules scattered. See map today's date for lesions noted . Otherwise remarkable for lesions as noted on map. See details of examination  See Assessment /Plan for additional history and physical exam also:    Assessment / plan:    No orders of the defined types were placed in this encounter. Meds & Refills for this Visit:  Requested Prescriptions     Signed Prescriptions Disp Refills    Tavaborole (KERYDIN) 5 % External Solution 10 mL 2     Sig: Use qd to nails as directed         Multiple nevi  (primary encounter diagnosis)  Seborrheic keratoses  Lentigo  Benign neoplasm of scalp and skin of neck  Benign neoplasm of skin of face  Benign neoplasm of skin of lower limb, including hip, unspecified laterality  Benign neoplasm of skin of upper limb, including shoulder, unspecified laterality  Benign neoplasm of skin of trunk, except scrotum  Inflamed seborrheic keratosis  History of atypical nevus    See details on map. Remarkable for:      Extensive lentigines waxy tan papules over the scalp. Larger lesions at occipital scalp. Patient notes worsening onychomycotic changes left foot toenails. Increasingly thick painful. Patient still very active. Discussed options. .  tavaborole every day  May take a year to grow out. May use over-the-counter antifungals if tinea pedis worsens. Continue monitoring    Reassurance regarding benign keratoses consider trial of cryo more bothersome lesions    Actinic Keratoses. Precancerous nature discussed. Sun protection, sunscreen/ blocks encouraged . Monitoring for new lesions. Sun damage additional recurrent and new actinic keratoses, skin cancers may occur in areas of prior actinic keratoses, related to past sun exposure to minimize current sun exposure. Sunscreen applied consistently regularly, reapplication and sun protection while driving recommended. Extensive junctional nevi  Extensive dark nevi fairly uniform benign patterns on dermoscopy  Extensive small nevi. No significant changes numerous pigmented lesions more than 150 pigmented lesions generalized nearly confluent continue careful follow-up dark lesions     no significant changes on dermatoscopy lesions all benign patterns. Continue careful follow-up. History of atypical lesion, family history SCC careful follow-up some protection. No suspicious lesions. Patient with lots of sun exposure in the past former runner. Now swimming  Outdoors more children living in the city    History of steroid-induced rosacea monitor carefully presently redness is stable  Consider niacinamide over-the-counter serum, normal  No other suspicious lesions    Moderate sun damage extensive pigmented lesions as noted. Follow-up annually or as needed  Please refer to map for specific lesions. See additional diagnoses. Pros cons of various therapies, risks benefits discussed. Pathophysiology discussed with patient. Therapeutic options reviewed. See  Medications in grid. Instructions reviewed at length. Benign nevi, seborrheic  keratoses, cherry angiomas:  Reassurance regarding other benign skin lesions. Signs and symptoms of skin cancer, ABCDE's of melanoma discussed with patient. Sunscreen use, sun protection, self exams reviewed. Followup as noted RTC routine checkup 6 mos - one year or p.r.n. Encounter Times   Including precharting, reviewing chart, prior notes obtaining history: 10 minutes, medical exam :10 minutes, notes on body map, plan, counseling 10minutes My total time spent caring for the patient on the day of the encounter: 30 minutes     The patient indicates understanding of these issues and agrees to the plan. The patient is asked to return as noted in follow-up/ above. This note was generated using Dragon voice recognition software. Please contact me regarding any confusion resulting from errors in recognition.

## 2023-10-31 RX ORDER — PENTOXIFYLLINE 400 MG/1
400 TABLET, EXTENDED RELEASE ORAL
Qty: 90 TABLET | Refills: 1 | Status: SHIPPED | OUTPATIENT
Start: 2023-10-31

## 2023-10-31 NOTE — TELEPHONE ENCOUNTER
From: Estelle Valentino  To: Polly Perez  Sent: 10/9/2023 2:52 PM CDT  Subject: TRENtal refill    Could I get a refill of the TRENtal prescription. That seemed to help with the Payrones condition.  If so, please forward to [id number] on [facility] in [place]. Thank you, Nita Matute

## 2023-12-09 ENCOUNTER — HOSPITAL ENCOUNTER (OUTPATIENT)
Dept: MRI IMAGING | Facility: HOSPITAL | Age: 63
Discharge: HOME | End: 2023-12-09
Attending: Other
Payer: COMMERCIAL

## 2023-12-09 DIAGNOSIS — G25.0 ESSENTIAL TREMOR: ICD-10-CM

## 2023-12-09 PROCEDURE — 70551 MRI BRAIN STEM W/O DYE: CPT | Performed by: OTHER

## 2023-12-11 DIAGNOSIS — N52.9 ERECTILE DYSFUNCTION, UNSPECIFIED ERECTILE DYSFUNCTION TYPE: ICD-10-CM

## 2023-12-12 RX ORDER — SILDENAFIL 50 MG/1
50 TABLET, FILM COATED ORAL
Qty: 10 TABLET | Refills: 0 | Status: SHIPPED | OUTPATIENT
Start: 2023-12-12

## 2023-12-12 NOTE — TELEPHONE ENCOUNTER
A refill request was received for:  Requested Prescriptions     Pending Prescriptions Disp Refills    SILDENAFIL CITRATE 50 MG Oral Tab [Pharmacy Med Name: Sildenafil Citrate 50 Mg Tab Amne] 10 tablet 0     Sig: Take 1 tablet (50 mg total) by mouth daily as needed for Erectile Dysfunction     Last refill date:  9/7/22    Last office visit: 9/6/23      Future Appointments   Date Time Provider Nancy Kirby   12/18/2023  3:30 PM Fremont Ganser, MD [facility]

## 2023-12-18 ENCOUNTER — OFFICE VISIT (OUTPATIENT)
Dept: SURGERY | Facility: CLINIC | Age: 63
End: 2023-12-18
Payer: COMMERCIAL

## 2023-12-18 DIAGNOSIS — N48.6 PEYRONIE'S DISEASE: Primary | ICD-10-CM

## 2023-12-18 PROCEDURE — 99214 OFFICE O/P EST MOD 30 MIN: CPT | Performed by: UROLOGY

## 2023-12-18 RX ORDER — PENTOXIFYLLINE 400 MG/1
400 TABLET, EXTENDED RELEASE ORAL
Qty: 90 TABLET | Refills: 1 | Status: SHIPPED | OUTPATIENT
Start: 2023-12-18

## 2023-12-18 NOTE — PROGRESS NOTES
Cristian Paulino is a 66year old male. HPI:     Chief Complaint   Patient presents with    Follow - Up     PT here for follow up visit to discuss pentoxifylline          68-year-old male with Peyronie's disease and follow-up to visit June 20, 2023 remaining on pentoxifylline 400 mg 3 times daily and vitamin E supplements. For the most part he states he has noticed slight improvement in the hourglass appearance of his penis. He denies any worsening symptoms. No pain with erections. Continues to use sildenafil 50 mg with moderate results. I suggested increasing the dose to 100 mg. No other reported complaints. Feels well otherwise. HISTORY:  Past Medical History:   Diagnosis Date    Blepharitis 04/17/2015    Cataracts, bilateral 2007    Essential tremor 09/07/2022    Glaucoma suspect 04/17/2015    Myopia with presbyopia of both eyes 04/17/2015    Nevus of conjunctiva- right eye nasally 04/17/2015    Ophthalmological disorder     Incresed C/D ratio, +FH Mom with COAG    Peyronie disease 12/19/2022    Pinguecula of both eyes 2007      Past Surgical History:   Procedure Laterality Date    ARTHROSCOPY, KNEE, SURGICAL; W/LATERAL RELEASE      COLONOSCOPY  April 2023    Clear, no issues    OTHER SURGICAL HISTORY  right knee scoped to remove excess cartilage    VASECTOMY        Family History   Problem Relation Age of Onset    Glaucoma Mother     Colon Cancer Mother 62    Cancer Mother         at 62years old    Hypertension Father 43        successfully treated with pills    Skin cancer Father 80        SCC to ear.     Cancer Father         lung cancer at age 80    Hypertension Sister         stable    No Known Problems Brother     No Known Problems Brother     No Known Problems Maternal Grandmother     No Known Problems Maternal Grandfather     No Known Problems Paternal Grandmother     No Known Problems Paternal Grandfather     Prostate Cancer Paternal Uncle 80    Diabetes Neg     Macular degeneration Neg Social History:   Social History     Socioeconomic History    Marital status:    Tobacco Use    Smoking status: Never    Smokeless tobacco: Never   Vaping Use    Vaping Use: Never used   Substance and Sexual Activity    Alcohol use: Yes     Alcohol/week: 6.0 standard drinks of alcohol     Types: 3 Glasses of wine, 3 Cans of beer per week     Comment: 5-6 units per week    Drug use: No    Sexual activity: Yes     Partners: Female     Birth control/protection: Vasectomy   Other Topics Concern    Grew up on a farm No    History of tanning Yes     Comment: Occasionally over life time    Outdoor occupation No    Pt has a pacemaker No    Pt has a defibrillator No    Reaction to local anesthetic No    Caffeine Concern Yes     Comment: coffee, 1 cup daily   Social History Narrative    Relationships:  - Nina    Children: 3 daughters - Rosy Rodriguez, Deya Ace (all grown)    Pets: [phone]: N/A    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 Origin: Grew up in [place]. Interests: Works out [employer], swims, bike rides, golf. Very active. Spending time with family. Spiritual: Spiritual - very much so. Has changed his attitude towards life. Goes to [employer] as well. Medications (Active prior to today's visit):  Current Outpatient Medications   Medication Sig Dispense Refill    pentoxifylline  MG Oral Tab CR Take 1 tablet (400 mg total) by mouth 3 (three) times daily with meals. 90 tablet 1    Tavaborole (KERYDIN) 5 % External Solution Use qd to nails as directed 10 mL 2    Sildenafil Citrate 50 MG Oral Tab Take 1 tablet (50 mg total) by mouth daily as needed for Erectile Dysfunction.  (Patient not taking: Reported on 12/18/2023) 10 tablet 0       Allergies:  No Known Allergies      ROS:       PHYSICAL EXAM:        ASSESSMENT/PLAN:   Assessment   Encounter Diagnosis Name Primary? Peyronie's disease Yes       Recommend:  -Continue pentoxifylline 400 mg 3 times daily for 6 additional months and then may stop. - Continue on vitamin E  -Increase dose of sildenafil to 100 mg.  - Follow-up otherwise in 6 months. Orders This Visit:  No orders of the defined types were placed in this encounter.       Meds This Visit:  Requested Prescriptions      No prescriptions requested or ordered in this encounter       Imaging & Referrals:  None     12/18/2023  Yane Salazar MD

## 2024-10-02 ENCOUNTER — OFFICE VISIT (OUTPATIENT)
Dept: INTERNAL MEDICINE CLINIC | Facility: CLINIC | Age: 64
End: 2024-10-02
Payer: COMMERCIAL

## 2024-10-02 VITALS
OXYGEN SATURATION: 100 % | HEART RATE: 56 BPM | WEIGHT: 177 LBS | SYSTOLIC BLOOD PRESSURE: 120 MMHG | DIASTOLIC BLOOD PRESSURE: 86 MMHG | TEMPERATURE: 98 F | BODY MASS INDEX: 21.33 KG/M2 | HEIGHT: 76.5 IN

## 2024-10-02 DIAGNOSIS — Z00.00 HEALTH MAINTENANCE EXAMINATION: Primary | ICD-10-CM

## 2024-10-02 DIAGNOSIS — G25.0 ESSENTIAL TREMOR: ICD-10-CM

## 2024-10-02 DIAGNOSIS — N52.9 ERECTILE DYSFUNCTION, UNSPECIFIED ERECTILE DYSFUNCTION TYPE: ICD-10-CM

## 2024-10-02 DIAGNOSIS — H93.13 TINNITUS OF BOTH EARS: ICD-10-CM

## 2024-10-02 DIAGNOSIS — N48.6 PEYRONIE DISEASE: ICD-10-CM

## 2024-10-02 DIAGNOSIS — H61.92 LESION OF EXTERNAL EAR CANAL, LEFT: ICD-10-CM

## 2024-10-02 DIAGNOSIS — H90.3 SENSORINEURAL HEARING LOSS (SNHL) OF BOTH EARS: ICD-10-CM

## 2024-10-02 DIAGNOSIS — L81.4 LENTIGO: ICD-10-CM

## 2024-10-02 DIAGNOSIS — L82.1 SEBORRHEIC KERATOSIS: ICD-10-CM

## 2024-10-02 DIAGNOSIS — D22.9 MULTIPLE NEVI: ICD-10-CM

## 2024-10-02 LAB
ALBUMIN SERPL-MCNC: 4.6 G/DL (ref 3.2–4.8)
ALBUMIN/GLOB SERPL: 1.8 {RATIO} (ref 1–2)
ALP LIVER SERPL-CCNC: 67 U/L
ALT SERPL-CCNC: 13 U/L
ANION GAP SERPL CALC-SCNC: 6 MMOL/L (ref 0–18)
AST SERPL-CCNC: 17 U/L (ref ?–34)
BILIRUB SERPL-MCNC: 0.7 MG/DL (ref 0.2–1.1)
BUN BLD-MCNC: 11 MG/DL (ref 9–23)
BUN/CREAT SERPL: 10.6 (ref 10–20)
CALCIUM BLD-MCNC: 9.4 MG/DL (ref 8.7–10.4)
CHLORIDE SERPL-SCNC: 105 MMOL/L (ref 98–112)
CHOLEST SERPL-MCNC: 182 MG/DL (ref ?–200)
CO2 SERPL-SCNC: 28 MMOL/L (ref 21–32)
CREAT BLD-MCNC: 1.04 MG/DL
EGFRCR SERPLBLD CKD-EPI 2021: 81 ML/MIN/1.73M2 (ref 60–?)
FASTING PATIENT LIPID ANSWER: YES
FASTING STATUS PATIENT QL REPORTED: YES
GLOBULIN PLAS-MCNC: 2.5 G/DL (ref 2–3.5)
GLUCOSE BLD-MCNC: 88 MG/DL (ref 70–99)
HDLC SERPL-MCNC: 73 MG/DL (ref 40–59)
LDLC SERPL CALC-MCNC: 98 MG/DL (ref ?–100)
NONHDLC SERPL-MCNC: 109 MG/DL (ref ?–130)
OSMOLALITY SERPL CALC.SUM OF ELEC: 287 MOSM/KG (ref 275–295)
POTASSIUM SERPL-SCNC: 4.3 MMOL/L (ref 3.5–5.1)
PROT SERPL-MCNC: 7.1 G/DL (ref 5.7–8.2)
SODIUM SERPL-SCNC: 139 MMOL/L (ref 136–145)
TRIGL SERPL-MCNC: 57 MG/DL (ref 30–149)
VLDLC SERPL CALC-MCNC: 9 MG/DL (ref 0–30)

## 2024-10-02 PROCEDURE — 3074F SYST BP LT 130 MM HG: CPT | Performed by: FAMILY MEDICINE

## 2024-10-02 PROCEDURE — 3008F BODY MASS INDEX DOCD: CPT | Performed by: FAMILY MEDICINE

## 2024-10-02 PROCEDURE — 3079F DIAST BP 80-89 MM HG: CPT | Performed by: FAMILY MEDICINE

## 2024-10-02 PROCEDURE — 80061 LIPID PANEL: CPT | Performed by: FAMILY MEDICINE

## 2024-10-02 PROCEDURE — 80053 COMPREHEN METABOLIC PANEL: CPT | Performed by: FAMILY MEDICINE

## 2024-10-02 PROCEDURE — 99396 PREV VISIT EST AGE 40-64: CPT | Performed by: FAMILY MEDICINE

## 2024-10-02 RX ORDER — SILDENAFIL 50 MG/1
50 TABLET, FILM COATED ORAL
Qty: 10 TABLET | Refills: 3 | Status: SHIPPED | OUTPATIENT
Start: 2024-10-02

## 2024-10-02 NOTE — ASSESSMENT & PLAN NOTE
Following with urology.  Completed course of pentoxifylline.  Monitoring for now, not desiring further intervention at this time.

## 2024-10-02 NOTE — ASSESSMENT & PLAN NOTE
Exercise and diet advised.  CMP, lipid panel, PSA  Flu shot and COVID vaccines later this fall  RSV vaccine  Shingles vaccine - advised  Advanced directive information - provide our office with a copy

## 2024-10-02 NOTE — ASSESSMENT & PLAN NOTE
Has seen ENT in the past.  Advise follow up with ENT to determine if there are any needs for intervention

## 2024-10-02 NOTE — PATIENT INSTRUCTIONS
PATIENT INSTRUCTIONS    Thank you for seeing me today, it was a pleasure taking care of you.  Please check out at the  and schedule a follow up appointment.  Return in about 1 year (around 10/2/2025) for physical.  Please remember that the francois period for all appointments is 5 minutes. This is to help maximize the amount of time that we can spend together at our visits.    Please get your labs drawn - you may need to schedule a lab appointment if this was not completed at your recent doctor's visit.  The following imaging studies were ordered: None  Please also follow up with the following specialists: ENT, dermatology  Please fill out the advance directive information (power of  documents) and bring a copy to our clinic.  Continue to focus on a healthy diet and exercise  Flu and COVID vaccines  Consider shingles vaccines  RSV vaccine      Nitin,   Dr. Gaffney

## 2024-10-02 NOTE — PROGRESS NOTES
FAMILY MEDICINE CLINIC NOTE    HPI  Jeremy Herron is a 64 year old male presenting for physical    #Health Maintenance  -Diet: Eats fruits and vegetables consistently. Does snack occasional.   -Exercise: Works out MWF - aerobic and weight lifting  -Lung cancer screen: Not indicated  -Colon cancer screen: - Dr Bustillo 4/2023 - repeat in 5 years  -Prostate cancer screen: - 9/2023  -Aortic aneurysm screen: Not indicated  -Statin:  Will check lipid panel  -ASA: Not indicated  -HIV screen: 9/2022  -Hep C screen: 9/2022  -Gonorrhea/chlamydia:  Not indicated  -Syphillis: Not indicated  -TB: Not indicated  -Tobacco/alcohol: Per below  -Depression: PHQ-2 score of 0 (score >/= 3 do PHQ-9)  -Advanced Directive: Indicated        #Immunizations  -Tdap: 9/2022  -Flu shot: Indicated  -PCV13: Not indicated   -PCV20: Not indicated   -PPSV23: Not indicated   -HPV: Not indicated  -RZV (preferred) or VZL: Indicated -declines   -RSV: Indicated  -COVID: Pfizer     #Peyronie's disease  -following with urology Dr Clifton   -pentoxifylline for 6 months - reports was helpful  -not desiring further intervention    #Lesion of left ear canal  #Tinnitus  #Sensorineural hearing loss both ears  -following with ENT Dr Meaed  -concerns that lesions are osteomas or exostosis, can monitor for now        #Erectile dysfunction  -sildenafil 50 mg as needed - works well, not using frequently  -no issues    #Essential tremor   -in the head  -saw neurology Dr Kumar  -MRI 12/2023 no acute intracranial process    #Numerous moles  -dermatology Dr Estes         #Seborrheic keratosis  #Lentigo  -following with dermatology Dr Estes  Patient Care Team:  Hank Gaffney MD as PCP - General (Family Medicine)  Gayla Estes MD (DERMATOLOGY)  Ramsey Meade MD (OTOLARYNGOLOGY)    ROS  GENERAL: No fever/chills, no recent weight loss   HEENT: No visual changes, no changes in hearing, no sore throats  NECK: No pain, no swelling  RESP: No cough, no SOB  CV: No  chest pain, no palpitations  GI: No abd pain, no N/V/D  MSK: No edema, no pain  SKIN: No new rashes  NEURO: No numbness, no tingling, no HA    HEALTH MAINTENANCE CHECKLIST  Health Maintenance Topics with due status: Overdue       Topic Date Due    Zoster Vaccines Never done    Annual Depression Screening 01/01/2024    COVID-19 Vaccine 09/01/2024    Annual Physical 09/06/2024     Health Maintenance Topics with due status: Due On       Topic Date Due    Influenza Vaccine 10/01/2024       ALLERGIES  No Known Allergies    MEDICATIONS  Current Outpatient Medications   Medication Sig Dispense Refill    Sildenafil Citrate 50 MG Oral Tab Take 1 tablet (50 mg total) by mouth daily as needed for Erectile Dysfunction. 10 tablet 3    Tavaborole (KERYDIN) 5 % External Solution Use qd to nails as directed 10 mL 2       ACTIVE PROBLEM  Patient Active Problem List   Diagnosis    Nevus of conjunctiva- right eye nasally    Multiple nevi    Seborrheic keratosis    Lentigo    Erectile dysfunction    Essential tremor    Tinnitus of both ears    Lesion of external ear canal, left    Health maintenance examination    Sensorineural hearing loss (SNHL) of both ears    Peyronie disease       PAST MEDICAL HISTORY  Past Medical History:    Blepharitis    Cataracts, bilateral    Essential tremor    Glaucoma suspect    Myopia with presbyopia of both eyes    Nevus of conjunctiva- right eye nasally    Ophthalmological disorder    Incresed C/D ratio, +FH Mom with COAG    Peyronie disease    Pinguecula of both eyes       PAST SOCIAL HISTORY  Social History     Socioeconomic History    Marital status:      Spouse name: Not on file    Number of children: Not on file    Years of education: Not on file    Highest education level: Not on file   Occupational History    Not on file   Tobacco Use    Smoking status: Never    Smokeless tobacco: Never   Vaping Use    Vaping status: Never Used   Substance and Sexual Activity    Alcohol use: Yes      Alcohol/week: 6.0 standard drinks of alcohol     Types: 3 Glasses of wine, 3 Cans of beer per week     Comment: 5-6 units per week    Drug use: No    Sexual activity: Yes     Partners: Female     Birth control/protection: Vasectomy   Other Topics Concern    Grew up on a farm No    History of tanning Yes     Comment: Occasionally over life time    Outdoor occupation No    Pt has a pacemaker No    Pt has a defibrillator No    Reaction to local anesthetic No     Service Not Asked    Blood Transfusions Not Asked    Caffeine Concern Yes     Comment: coffee, 1 cup daily    Occupational Exposure Not Asked    Hobby Hazards Not Asked    Sleep Concern Not Asked    Stress Concern Not Asked    Weight Concern Not Asked    Special Diet Not Asked    Back Care Not Asked    Exercise Not Asked    Bike Helmet Not Asked    Seat Belt Not Asked    Self-Exams Not Asked   Social History Narrative    Relationships:  - Nina    Children: 3 daughters - Perlita Calvillo, Wendy (all grown)    Pets: [employer]    School: N/A    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     Origin: Grew up in [place].     Interests: Works out MWF, swims, bike rides, golf. Very active. Spending time with family.    Spiritual: Spiritual - very much so. Has changed his attitude towards life. Goes to [employer] as well.      Social Determinants of Health     Financial Resource Strain: Not on file   Food Insecurity: Not on file   Transportation Needs: Not on file   Physical Activity: Not on file   Stress: Not on file   Social Connections: Not on file   Housing Stability: Not on file       PAST SURGICAL HISTORY  Past Surgical History:   Procedure Laterality Date    Arthroscopy, knee, surgical; w/lateral release      Colonoscopy  April 2023    Clear, no issues    Other surgical history  right knee scoped to remove excess cartilage     Vasectomy         PAST FAMILY HISTORY  Family History   Problem Relation Age of Onset    Glaucoma Mother     Colon Cancer Mother 58    Hypertension Father 42    Skin cancer Father 81        SCC to ear.    Cancer Father         lung cancer at age 82    Hypertension Sister     No Known Problems Brother     No Known Problems Brother     No Known Problems Maternal Grandmother     No Known Problems Maternal Grandfather     No Known Problems Paternal Grandmother     No Known Problems Paternal Grandfather     Prostate Cancer Paternal Uncle 85    Diabetes Neg     Macular degeneration Neg        PHYSICAL EXAM  Vitals:    10/02/24 0915   BP: 120/86   Pulse: 56   Temp: 98 °F (36.7 °C)   SpO2: 100%   Weight: 177 lb (80.3 kg)   Height: 6' 4.5\" (1.943 m)      Body mass index is 21.26 kg/m².    GENERAL: NAD  HEENT: Moist mucous membranes, no tonsillar swelling or erythema, PERRLA bilat, TM translucent and non-bulging. L ear canal there are two small papules in the 12 oclock region.   NECK: Supple, non-tender  RESP: CTAB, no wheezing, no rales, no rhonchi  CV: RRR, no murmurs  GI: Soft, non-distended, non-tender, no guarding, no rebound, no masses  MSK: No edema  SKIN: Warm and dry, numerous small brown macules scattered throughout the body.  Scattered seborrheic keratosis, scattered cherry hemangiomas   NEURO: Answering questions appropriately    LABS  Lab Results   Component Value Date    WBC 8.1 09/07/2022    HGB 14.4 09/07/2022    HCT 42.0 09/07/2022     09/07/2022    NEPERCENT 69.9 09/07/2022    LYPERCENT 19.2 09/07/2022    MOPERCENT 6.8 09/07/2022    EOPERCENT 3.5 09/07/2022    BAPERCENT 0.6 09/07/2022    NE 5,662 09/07/2022    LYMABS 1,555 09/07/2022    MOABSO 551 09/07/2022    EOABSO 284 09/07/2022    BAABSO 49 09/07/2022       Lab Results   Component Value Date     09/06/2023    K 3.8 09/06/2023     09/06/2023    CO2 25.0 09/06/2023    ANIONGAP 8 09/06/2023    BUN 11 09/06/2023    CREATSERUM 1.05  09/06/2023    BUNCREA 10.5 09/06/2023    GLU 89 09/06/2023    CA 9.0 09/06/2023    OSMOCALC 287 09/06/2023    GFRNAA 78 08/25/2021    GFRAA 91 08/25/2021    ALT 28 09/06/2023    AST 16 09/06/2023    ALKPHO 71 09/06/2023    BILT 0.8 09/06/2023    TP 7.4 09/06/2023    ALB 4.1 09/06/2023    GLOBULIN 3.3 09/06/2023    ELECTAG 1.2 09/06/2023    FASTING No 09/06/2023    FASTING No 09/06/2023         Lab Results   Component Value Date    CHOLEST 93 09/06/2023    TRIG 24 (L) 09/06/2023    HDL 80 (H) 09/06/2023    LDL 3 09/06/2023    VLDL 3 09/06/2023    TCHDLRATIO 3.0 09/07/2022    NONHDLC 13 09/06/2023        DIAGNOSTICS    ASSESSMENT/PLAN  Problem List Items Addressed This Visit          Neuro    Essential tremor     Mild head tremor.  Evaluated by neurology  Monitoring for now.  Can consider propranolol if needed in future.             Genitourinary and Reproductive    Erectile dysfunction     History of erectile dysfunction, tolerating sildenafil well.         Relevant Medications    Sildenafil Citrate 50 MG Oral Tab    Peyronie disease     Following with urology.  Completed course of pentoxifylline.  Monitoring for now, not desiring further intervention at this time.             EarNoseThroat    Lesion of external ear canal, left     Has seen ENT in the past.  Advise follow up with ENT to determine if there are any needs for intervention         Relevant Orders    ENT - INTERNAL    Sensorineural hearing loss (SNHL) of both ears     Has seen ENT in the past.  Monitoring for now.          Tinnitus of both ears     Has seen ENT in the past.  Monitoring for now.             Skin    Lentigo     Multiple nevi seen on examination.  History of seborrheic keratosis, cherry hemangioma, lentigo, multiple nevi.  Continue to follow with dermatology for routine skin examination.         Multiple nevi     Multiple nevi seen on examination.  History of seborrheic keratosis, cherry hemangioma, lentigo, multiple nevi.  Continue to  follow with dermatology for routine skin examination.         Seborrheic keratosis     Multiple nevi seen on examination.  History of seborrheic keratosis, cherry hemangioma, lentigo, multiple nevi.  Continue to follow with dermatology for routine skin examination.            Health Encounters    Health maintenance examination - Primary     Exercise and diet advised.  CMP, lipid panel, PSA  Flu shot and COVID vaccines later this fall  RSV vaccine  Shingles vaccine - advised  Advanced directive information - provide our office with a copy         Relevant Orders    Comp Metabolic Panel (14)    Lipid Panel       Return in about 1 year (around 10/2/2025) for physical.    Hank Gaffney MD  Family Medicine

## 2024-11-08 ENCOUNTER — OFFICE VISIT (OUTPATIENT)
Dept: DERMATOLOGY CLINIC | Facility: CLINIC | Age: 64
End: 2024-11-08
Payer: COMMERCIAL

## 2024-11-08 DIAGNOSIS — L81.4 LENTIGO: ICD-10-CM

## 2024-11-08 DIAGNOSIS — D22.9 MULTIPLE NEVI: ICD-10-CM

## 2024-11-08 DIAGNOSIS — D48.5 NEOPLASM OF UNCERTAIN BEHAVIOR OF SKIN: Primary | ICD-10-CM

## 2024-11-08 DIAGNOSIS — L82.1 SEBORRHEIC KERATOSES: ICD-10-CM

## 2024-11-08 DIAGNOSIS — Z87.898 HISTORY OF ATYPICAL NEVUS: ICD-10-CM

## 2024-11-08 DIAGNOSIS — L57.0 AK (ACTINIC KERATOSIS): ICD-10-CM

## 2024-11-08 DIAGNOSIS — Z13.89 ENCOUNTER FOR SURVEILLANCE OF ABNORMAL NEVI: ICD-10-CM

## 2024-11-08 PROCEDURE — 88305 TISSUE EXAM BY PATHOLOGIST: CPT | Performed by: DERMATOLOGY

## 2024-11-08 PROCEDURE — 11102 TANGNTL BX SKIN SINGLE LES: CPT | Performed by: DERMATOLOGY

## 2024-11-08 PROCEDURE — 99214 OFFICE O/P EST MOD 30 MIN: CPT | Performed by: DERMATOLOGY

## 2024-11-08 RX ORDER — IMIQUIMOD 12.5 MG/.25G
CREAM TOPICAL
Qty: 6 EACH | Refills: 1 | Status: SHIPPED | OUTPATIENT
Start: 2024-11-08

## 2024-11-12 NOTE — PROGRESS NOTES
The pathology report from last visit showed  , left suprabrow, shave biopsy:  -Basal cell carcinoma, nodular type.  Recommend Mohs-Dr. Cruz's office- referral placed  Please log in test results.  Please call patient and inform of results and recommendations.  (added to history).  Pt to  rtc  4-5 mos   or prn.

## 2024-11-12 NOTE — PROGRESS NOTES
Operative Report                     Shave/  Tangential biopsy     Clinical diagnosis:    Size of lesion:    Location:     Patient with new crusted papule left suprabrow 5mm r/o BCC,     Procedure:    With patient in appropriate position the skin of the above was scrubbed with alcohol.  Anesthesia was obtained with 1% Xylocaine with epinephrine.  The skin surrounding the lesion was placed under tension and the lesion was incised using a #15 scalpel blade.  The specimen was sent for histopathologic exam.    Hemostasis was obtained with electrocautery/aluminum chloride.  Estimated blood loss less than 2 cc.    Biopsy dressed with Polysporin, bandage.    Pressure dressing:   No    Complications: None    Written instructions given and reviewed with patient    Await pathology    Contact information reviewed.    Procedural physician:  Gayla Estes MD

## 2024-11-12 NOTE — PROGRESS NOTES
Patient informed of test results and all KMT's recommendations. Voiced understanding. He states he will do his own research on [facility] surgeons and CB with decision which one he chooses.

## 2024-11-12 NOTE — PROGRESS NOTES
Jeremy Herron is a 64 year old male.  HPI:     CC:    Chief Complaint   Patient presents with    Full Skin Exam     LOV 10/11/2023. Pt present for FBSE. No areas of concern, but reports some spots on face. Father has hx of SCC         Allergies:  Patient has no known allergies.    HISTORY:    Past Medical History:    BCC (basal cell carcinoma), face    left suprabrow-Basal cell carcinoma, nodular type    Blepharitis    Cataracts, bilateral    Essential tremor    Glaucoma suspect    Myopia with presbyopia of both eyes    Nevus of conjunctiva- right eye nasally    Ophthalmological disorder    Incresed C/D ratio, +FH Mom with COAG    Peyronie disease    Pinguecula of both eyes      Past Surgical History:   Procedure Laterality Date    Arthroscopy, knee, surgical; w/lateral release      Colonoscopy  April 2023    Clear, no issues    Other surgical history  right knee scoped to remove excess cartilage    Vasectomy        Family History   Problem Relation Age of Onset    Glaucoma Mother     Colon Cancer Mother 58    Hypertension Father 42    Skin cancer Father 81        SCC to ear.    Cancer Father         lung cancer at age 82    Hypertension Sister     No Known Problems Brother     No Known Problems Brother     No Known Problems Maternal Grandmother     No Known Problems Maternal Grandfather     No Known Problems Paternal Grandmother     No Known Problems Paternal Grandfather     Prostate Cancer Paternal Uncle 85    Diabetes Neg     Macular degeneration Neg       Social History     Socioeconomic History    Marital status:    Tobacco Use    Smoking status: Never    Smokeless tobacco: Never   Vaping Use    Vaping status: Never Used   Substance and Sexual Activity    Alcohol use: Yes     Alcohol/week: 6.0 standard drinks of alcohol     Types: 3 Glasses of wine, 3 Cans of beer per week     Comment: 5-6 units per week    Drug use: No    Sexual activity: Yes     Partners: Female     Birth control/protection: Vasectomy    Other Topics Concern    Grew up on a farm No    History of tanning Yes     Comment: Occasionally over life time    Outdoor occupation No    Pt has a pacemaker No    Pt has a defibrillator No    Reaction to local anesthetic No    Caffeine Concern Yes     Comment: coffee, 1 cup daily   Social History Narrative    Relationships:  - Nina    Children: 3 daughters - Perlita Calvillo Lauren (all grown)    Pets: Finderychen    School: N/A    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     Origin: Grew up in [place].     Interests: Works out MWF, swims, bike rides, golf. Very active. Spending time with family.    Spiritual: Spiritual - very much so. Has changed his attitude towards life. Goes to [employer] as well.         Current Outpatient Medications   Medication Sig Dispense Refill    Imiquimod 5 % External Cream Apply topically 2 times every week to affected area at right cheek 6 each 1    Sildenafil Citrate 50 MG Oral Tab Take 1 tablet (50 mg total) by mouth daily as needed for Erectile Dysfunction. 10 tablet 3    Tavaborole (KERYDIN) 5 % External Solution Use qd to nails as directed (Patient not taking: Reported on 11/8/2024) 10 mL 2     Allergies:   No Known Allergies    Past Medical History:    BCC (basal cell carcinoma), face    left suprabrow-Basal cell carcinoma, nodular type    Blepharitis    Cataracts, bilateral    Essential tremor    Glaucoma suspect    Myopia with presbyopia of both eyes    Nevus of conjunctiva- right eye nasally    Ophthalmological disorder    Incresed C/D ratio, +FH Mom with COAG    Peyronie disease    Pinguecula of both eyes     Past Surgical History:   Procedure Laterality Date    Arthroscopy, knee, surgical; w/lateral release      Colonoscopy  April 2023    Clear, no issues    Other surgical history  right knee scoped to remove excess cartilage    Vasectomy        Social History     Socioeconomic History    Marital status:      Spouse name: Not on file    Number of children: Not on file    Years of education: Not on file    Highest education level: Not on file   Occupational History    Not on file   Tobacco Use    Smoking status: Never    Smokeless tobacco: Never   Vaping Use    Vaping status: Never Used   Substance and Sexual Activity    Alcohol use: Yes     Alcohol/week: 6.0 standard drinks of alcohol     Types: 3 Glasses of wine, 3 Cans of beer per week     Comment: 5-6 units per week    Drug use: No    Sexual activity: Yes     Partners: Female     Birth control/protection: Vasectomy   Other Topics Concern    Grew up on a farm No    History of tanning Yes     Comment: Occasionally over life time    Outdoor occupation No    Pt has a pacemaker No    Pt has a defibrillator No    Reaction to local anesthetic No     Service Not Asked    Blood Transfusions Not Asked    Caffeine Concern Yes     Comment: coffee, 1 cup daily    Occupational Exposure Not Asked    Hobby Hazards Not Asked    Sleep Concern Not Asked    Stress Concern Not Asked    Weight Concern Not Asked    Special Diet Not Asked    Back Care Not Asked    Exercise Not Asked    Bike Helmet Not Asked    Seat Belt Not Asked    Self-Exams Not Asked   Social History Narrative    Relationships:  - Nina    Children: 3 daughters - Perlita Calvillo Lauren (all grown)    Pets: Nurego    School: N/A    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     Origin: Grew up in [place].     Interests: Works out MWF, swims, bike rides, golf. Very active. Spending time with family.    Spiritual: Spiritual - very much so. Has changed his attitude towards life. Goes to [employer] as well.      Social Drivers of Health     Financial Resource Strain: Not on file   Food Insecurity: Not on file    Transportation Needs: Not on file   Physical Activity: Not on file   Stress: Not on file   Social Connections: Not on file   Housing Stability: Not on file     Family History   Problem Relation Age of Onset    Glaucoma Mother     Colon Cancer Mother 58    Hypertension Father 42    Skin cancer Father 81        SCC to ear.    Cancer Father         lung cancer at age 82    Hypertension Sister     No Known Problems Brother     No Known Problems Brother     No Known Problems Maternal Grandmother     No Known Problems Maternal Grandfather     No Known Problems Paternal Grandmother     No Known Problems Paternal Grandfather     Prostate Cancer Paternal Uncle 85    Diabetes Neg     Macular degeneration Neg        There were no vitals filed for this visit.    HPI:    Chief Complaint   Patient presents with    Full Skin Exam     LOV 10/11/2023. Pt present for FBSE. No areas of concern, but reports some spots on face. Father has hx of SCC     Follow-up extensive pigmented lesions, history of atypical nevus    Has been careful to  use sunscreen.  Family history of skin cancer as noted father with history of SCC previously patient with history remote of clinically atypical nevi, numerous pigmented lesions. No particular changes as 1st patient aware    Patient presents with concerns above.    Past notes/ records and appropriate/relevant lab results including pathology and past body maps reviewed. Updated and new information noted in current visit.     Patient has been in their usual state of health.  History, medications, allergies reviewed as noted.      ROS:  Denies any other systemic complaints.  No new or changeing lesions other than noted above. No fevers, chills, night sweats, unusual sun sensitivity.  No other skin complaints.        History, medications, allergies reviewed as noted.       Physical Examination:     Well-developed well-nourished patient alert oriented in no acute distress.  Exam total-body performed,  including scalp, head, neck, face,nails, hair, external eyes, including conjunctival mucosa, eyelids, lips external ears, back, chest,/ breasts, axillae,  abdomen, arms, legs, palms.     Multiple light to medium brown, well marginated, uniformly pigmented, macules and papules 6 mm and less scattered on exam. pigmented lesions examined with dermoscopy benign-appearing patterns.     Waxy tannish keratotic papules scattered, cherry-red vascular papules scattered.    See map today's date for lesions noted .      Otherwise remarkable for lesions as noted on map.  See details of examination  See Assessment /Plan for additional history and physical exam also:    Assessment / plan:    Orders Placed This Encounter   Procedures    Specimen to Pathology, Tissue [[facility] Pt to [facility]]       Meds & Refills for this Visit:  Requested Prescriptions     Signed Prescriptions Disp Refills    Imiquimod 5 % External Cream 6 each 1     Sig: Apply topically 2 times every week to affected area at right cheek         Encounter Diagnoses   Name Primary?    Neoplasm of uncertain behavior of skin Yes    AK (actinic keratosis)     Multiple nevi     Seborrheic keratoses     Lentigo     History of atypical nevus     Encounter for surveillance of abnormal nevi        See details on map.      Remarkable for:       Patient with new crusted papule left suprabrow 5mm r/o BCC,   Shave/ tangential biopsy performed, operative note and consent in chart further plans pending pathology    AK at right infraorbital cheek actinic keratoses.  Precancerous nature discussed.  Treatment options reviewed at length we will proceed with topical therapy with   Aldara twice weekly for   6   week course  of actual medication use and may alternate weeks if necessary.  Increased redness scaling crusting irritation pain tenderness potential discussed.  Anticipate one month for resolution of symptoms.  Plan recheck in one month after completion of treatment course.   Consider alternative treatment biopsy if not resolved.    Numerous benign keratoses vertex, left temple  Extensive lentigines waxy tan papules over the scalp.  Larger lesions at occipital scalp.    Patient notes worsening onychomycotic changes left foot toenails.  Increasingly thick painful.  Patient still very active.  Discussed options..  tavaborole every day  May take a year to grow out.  May use over-the-counter antifungals if tinea pedis worsens.  Continue monitoring    Reassurance regarding benign keratoses consider trial of cryo more bothersome lesions    Actinic Keratoses.  Precancerous nature discussed. Sun protection, sunscreen/ blocks encouraged .  Monitoring for new lesions.  Sun damage additional recurrent and new actinic keratoses, skin cancers may occur in areas of prior actinic keratoses, related to past sun exposure to minimize current sun exposure.  Sunscreen applied consistently regularly, reapplication and sun protection while driving recommended.    Extensive junctional nevi  Extensive dark nevi fairly uniform benign patterns on dermoscopy  Extensive small nevi.  No significant changes numerous pigmented lesions more than 150 pigmented lesions generalized nearly confluent continue careful follow-up dark lesions     no significant changes on dermatoscopy lesions all benign patterns.  Continue careful follow-up.  History of atypical lesion, family history SCC careful follow-up some protection.  No suspicious lesions.  Patient with lots of sun exposure in the past former runner. Now swimming  Outdoors more children [place] the city    History of steroid-induced rosacea monitor carefully presently redness is stable  Consider niacinamide over-the-counter serum, normal  No other suspicious lesions    Moderate sun damage extensive pigmented lesions as noted.  Family history of skin cancer melanoma in patient's father.  More careful follow-up, every 6 months.  Patient notes brother recently with Mohs  as well.  Follow-up annually or as needed  Please refer to map for specific lesions.  See additional diagnoses.  Pros cons of various therapies, risks benefits discussed.Pathophysiology discussed with patient.  Therapeutic options reviewed.  See  Medications in grid.  Instructions reviewed at length.    Benign nevi, seborrheic  keratoses, cherry angiomas:  Reassurance regarding other benign skin lesions.Signs and symptoms of skin cancer, ABCDE's of melanoma discussed with patient. Sunscreen use, sun protection, self exams reviewed.  Followup as noted RTC routine checkup 6 mos - one year or p.r.n.  Encounter Times   Including precharting, reviewing chart, prior notes obtaining history: 10 minutes, medical exam :10 minutes, notes on body map, plan, counseling 10minutes My total time spent caring for the patient on the day of the encounter: 30 minutes     The patient indicates understanding of these issues and agrees to the plan.  The patient is asked to return as noted in follow-up/ above.    This note was generated using Dragon voice recognition software.  Please contact me regarding any confusion resulting from errors in recognition.

## 2024-12-17 ENCOUNTER — MED REC SCAN ONLY (OUTPATIENT)
Dept: DERMATOLOGY CLINIC | Facility: CLINIC | Age: 64
End: 2024-12-17

## 2025-01-20 ENCOUNTER — OFFICE VISIT (OUTPATIENT)
Dept: DERMATOLOGY CLINIC | Facility: CLINIC | Age: 65
End: 2025-01-20
Payer: COMMERCIAL

## 2025-01-20 DIAGNOSIS — L81.4 LENTIGO: ICD-10-CM

## 2025-01-20 DIAGNOSIS — Z13.89 ENCOUNTER FOR SURVEILLANCE OF ABNORMAL NEVI: ICD-10-CM

## 2025-01-20 DIAGNOSIS — L57.0 AK (ACTINIC KERATOSIS): Primary | ICD-10-CM

## 2025-01-20 DIAGNOSIS — Z51.81 MEDICATION MONITORING ENCOUNTER: ICD-10-CM

## 2025-01-20 DIAGNOSIS — Z85.828 ENCOUNTER FOR FOLLOW-UP SURVEILLANCE OF SKIN CANCER: ICD-10-CM

## 2025-01-20 DIAGNOSIS — L82.1 SEBORRHEIC KERATOSES: ICD-10-CM

## 2025-01-20 DIAGNOSIS — D22.9 MULTIPLE NEVI: ICD-10-CM

## 2025-01-20 DIAGNOSIS — Z08 ENCOUNTER FOR FOLLOW-UP SURVEILLANCE OF SKIN CANCER: ICD-10-CM

## 2025-01-20 DIAGNOSIS — Z87.898 HISTORY OF ATYPICAL NEVUS: ICD-10-CM

## 2025-01-20 PROCEDURE — 99214 OFFICE O/P EST MOD 30 MIN: CPT | Performed by: DERMATOLOGY

## 2025-01-20 RX ORDER — IMIQUIMOD 12.5 MG/.25G
CREAM TOPICAL
Qty: 12 EACH | Refills: 1 | Status: SHIPPED | OUTPATIENT
Start: 2025-01-20

## 2025-01-20 RX ORDER — SODIUM FLUORIDE 6 MG/ML
PASTE, DENTIFRICE DENTAL
COMMUNITY
Start: 2024-11-10

## 2025-01-20 NOTE — PROGRESS NOTES
The following individual(s) verbally consented to be recorded using ambient AI listening technology and understand that they can each withdraw their consent to this listening technology at any point by asking the clinician to turn off or pause the recording: Jeremy Herron

## 2025-01-26 NOTE — PROGRESS NOTES
Jeremy Herron is a 64 year old male.  HPI:     CC:    Chief Complaint   Patient presents with    Actinic Keratosis     LOV 11/08/24. Pt presents for AK at right infraorbital cheek. Notes, the area has healed well. Has discontinued miquimod 5 % External Cream. Has c/o flat lesion, with white dot in the middle on R temple. Applying vitamin C topical. Personal Hx of BCC. Family Hx of SCC(Brother).         Allergies:  Patient has no known allergies.    HISTORY:    Past Medical History:    BCC (basal cell carcinoma), face    left suprabrow-Basal cell carcinoma, nodular type    Blepharitis    Cataracts, bilateral    Essential tremor    Glaucoma suspect    Myopia with presbyopia of both eyes    Nevus of conjunctiva- right eye nasally    Ophthalmological disorder    Incresed C/D ratio, +FH Mom with COAG    Peyronie disease    Pinguecula of both eyes      Past Surgical History:   Procedure Laterality Date    Arthroscopy, knee, surgical; w/lateral release      Colonoscopy  April 2023    Clear, no issues    Other surgical history  right knee scoped to remove excess cartilage    Vasectomy        Family History   Problem Relation Age of Onset    Glaucoma Mother     Colon Cancer Mother 58    Hypertension Father 42    Skin cancer Father 81        SCC to ear.    Cancer Father         lung cancer at age 82    Hypertension Sister     No Known Problems Brother     No Known Problems Brother     No Known Problems Maternal Grandmother     No Known Problems Maternal Grandfather     No Known Problems Paternal Grandmother     No Known Problems Paternal Grandfather     Prostate Cancer Paternal Uncle 85    Diabetes Neg     Macular degeneration Neg       Social History     Socioeconomic History    Marital status:    Tobacco Use    Smoking status: Never    Smokeless tobacco: Never   Vaping Use    Vaping status: Never Used   Substance and Sexual Activity    Alcohol use: Yes     Alcohol/week: 6.0 standard drinks of alcohol     Types: 3  Glasses of wine, 3 Cans of beer per week     Comment: 5-6 units per week    Drug use: No    Sexual activity: Yes     Partners: Female     Birth control/protection: Vasectomy   Other Topics Concern    Grew up on a farm No    History of tanning Yes     Comment: Occasionally over life time    Outdoor occupation No    Pt has a pacemaker No    Pt has a defibrillator No    Reaction to local anesthetic No    Caffeine Concern Yes     Comment: coffee, 1 cup daily   Social History Narrative    Relationships:  - Nina    Children: 3 daughters - Perlita Calvillo, Wendy (all grown)    Pets: Double Fusion    School: N/A    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 Starting to think about retiring.     Origin: Grew up in [facility] [place].     Interests: Works out MWF, swims, bike rides, golf. Very active. Spending time with family.    Spiritual: Spiritual - very much so. Has changed his attitude towards life. Goes to [employer] as well.         Current Outpatient Medications   Medication Sig Dispense Refill    SODIUM FLUORIDE 5000 PPM 1.1 % Dental Paste USE AT NIGHT FOR 4-5 MINUTESNO RINSING OR DRINKING      Imiquimod 5 % External Cream Apply topically 2 times every week to left scalp, left forehead, temples 12 each 1    Sildenafil Citrate 50 MG Oral Tab Take 1 tablet (50 mg total) by mouth daily as needed for Erectile Dysfunction. 10 tablet 3    Tavaborole (KERYDIN) 5 % External Solution Use qd to nails as directed (Patient not taking: Reported on 1/20/2025) 10 mL 2     Allergies:   No Known Allergies    Past Medical History:    BCC (basal cell carcinoma), face    left suprabrow-Basal cell carcinoma, nodular type    Blepharitis    Cataracts, bilateral    Essential tremor    Glaucoma suspect    Myopia with presbyopia of both eyes    Nevus of conjunctiva- right eye nasally    Ophthalmological disorder    Incresed C/D ratio, +FH Mom with COAG    Jessica  disease    Pinguecula of both eyes     Past Surgical History:   Procedure Laterality Date    Arthroscopy, knee, surgical; w/lateral release      Colonoscopy  April 2023    Clear, no issues    Other surgical history  right knee scoped to remove excess cartilage    Vasectomy       Social History     Socioeconomic History    Marital status:      Spouse name: Not on file    Number of children: Not on file    Years of education: Not on file    Highest education level: Not on file   Occupational History    Not on file   Tobacco Use    Smoking status: Never    Smokeless tobacco: Never   Vaping Use    Vaping status: Never Used   Substance and Sexual Activity    Alcohol use: Yes     Alcohol/week: 6.0 standard drinks of alcohol     Types: 3 Glasses of wine, 3 Cans of beer per week     Comment: 5-6 units per week    Drug use: No    Sexual activity: Yes     Partners: Female     Birth control/protection: Vasectomy   Other Topics Concern    Grew up on a farm No    History of tanning Yes     Comment: Occasionally over life time    Outdoor occupation No    Pt has a pacemaker No    Pt has a defibrillator No    Reaction to local anesthetic No     Service Not Asked    Blood Transfusions Not Asked    Caffeine Concern Yes     Comment: coffee, 1 cup daily    Occupational Exposure Not Asked    Hobby Hazards Not Asked    Sleep Concern Not Asked    Stress Concern Not Asked    Weight Concern Not Asked    Special Diet Not Asked    Back Care Not Asked    Exercise Not Asked    Bike Helmet Not Asked    Seat Belt Not Asked    Self-Exams Not Asked   Social History Narrative    Relationships:  - Nina    Children: 3 daughters - Perlita Calvillo, Wendy (all grown)    Pets: Accolade    School: N/A    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     Origin: Grew up in [facility].     Interests: Works out MWF, swims,  bike rides, golf. Very active. Spending time with family.    Spiritual: Spiritual - very much so. Has changed his attitude towards life. Goes to [employer] as well.      Social Drivers of Health     Financial Resource Strain: Not on file   Food Insecurity: Not on file   Transportation Needs: Not on file   Physical Activity: Not on file   Stress: Not on file   Social Connections: Not on file   Housing Stability: Not on file     Family History   Problem Relation Age of Onset    Glaucoma Mother     Colon Cancer Mother 58    Hypertension Father 42    Skin cancer Father 81        SCC to ear.    Cancer Father         lung cancer at age 82    Hypertension Sister     No Known Problems Brother     No Known Problems Brother     No Known Problems Maternal Grandmother     No Known Problems Maternal Grandfather     No Known Problems Paternal Grandmother     No Known Problems Paternal Grandfather     Prostate Cancer Paternal Uncle 85    Diabetes Neg     Macular degeneration Neg        There were no vitals filed for this visit.    HPI:    Chief Complaint   Patient presents with    Actinic Keratosis     LOV 11/08/24. Pt presents for AK at right infraorbital cheek. Notes, the area has healed well. Has discontinued miquimod 5 % External Cream. Has c/o flat lesion, with white dot in the middle on R temple. Applying vitamin C topical. Personal Hx of BCC. Family Hx of SCC(Brother).     Follow-up extensive pigmented lesions, history of atypical nevus    Has been careful to  use sunscreen.  Family history of skin cancer as noted father with history of SCC previously patient with history remote of clinically atypical nevi, numerous pigmented lesions. No particular changes as 1st patient aware    Patient presents with concerns above.    History of Present Illness  The patient, with a history of basal cell carcinoma and actinic keratosis, presents for a follow-up visit. He reports no discomfort or symptoms related to the treated areas. However,  he mentions a benign keratosis on the scalp that does not bother him but is of concern to his spouse. He also notes a benign keratosis on the left forehead, which again, does not cause discomfort.    The patient also mentions a spot on the temple that has been previously examined and treated with imiquimod. He reports that the area has healed well with no recurrence of basal cell carcinoma. However, he notes a new crusty spot in the treated area, which he believes could be a mole or a result of stitches.    The patient also reports a raised spot in the center of the forehead from a previous incision. He expresses a preference for using imiquimod to address any potential precancerous spots, as he found the treatment effective and non-invasive in the past.    Lastly, the patient mentions a spot on the right temple where his sunglasses would hit, which he believes to be an age spot. He expresses a willingness to treat all identified spots with imiquimod.      Past notes/ records and appropriate/relevant lab results including pathology and past body maps reviewed. Updated and new information noted in current visit.     Patient has been in their usual state of health.  History, medications, allergies reviewed as noted.      ROS:  Denies any other systemic complaints.  No new or changeing lesions other than noted above. No fevers, chills, night sweats, unusual sun sensitivity.  No other skin complaints.        History, medications, allergies reviewed as noted.       Physical Examination:     Well-developed well-nourished patient alert oriented in no acute distress.  Exam total-body performed, including scalp, head, neck, face,nails, hair, external eyes, including conjunctival mucosa, eyelids, lips external ears, back, chest,/ breasts, axillae,  abdomen, arms, legs, palms.     Multiple light to medium brown, well marginated, uniformly pigmented, macules and papules 6 mm and less scattered on exam. pigmented lesions  examined with dermoscopy benign-appearing patterns.     Waxy tannish keratotic papules scattered, cherry-red vascular papules scattered.    See map today's date for lesions noted .      Otherwise remarkable for lesions as noted on map.  See details of examination  See Assessment /Plan for additional history and physical exam also:    Assessment / plan:    No orders of the defined types were placed in this encounter.      Meds & Refills for this Visit:  Requested Prescriptions     Signed Prescriptions Disp Refills    Imiquimod 5 % External Cream 12 each 1     Sig: Apply topically 2 times every week to left scalp, left forehead, temples         Encounter Diagnoses   Name Primary?    AK (actinic keratosis) Yes    Multiple nevi     Seborrheic keratoses     Encounter for follow-up surveillance of skin cancer     Lentigo     History of atypical nevus     Encounter for surveillance of abnormal nevi     Medication monitoring encounter        See details on map.      Remarkable for:  Physical Exam  SKIN: Scalp with benign keratosis, flat, non-tender. Forehead with benign keratosis, non-tender. Temple, lower left side near eyebrow, small spot, benign keratosis, non-tender. Basal cell carcinoma site on temple well-healed, no signs of recurrence. Lower temple toward hairline has small, raised spot, benign keratosis, non-tender. Right temple shows age spot near hairline, non-tender.    Results  PATHOLOGY  Basal cell carcinoma site: No recurrence    Left supra brow BCC post Mohs Dr. Cruz 11/24    Assessment & Plan  Basal Cell Carcinoma (BCC)  Follow-up for previously treated BCC on the left temple. No recurrence noted. Benign keratoses present but no malignancy.  - Apply imiquimod to the left temple twice weekly for six weeks.  - Massage the scar to reduce scar tissue.  - Follow-up in six months.    Actinic Keratosis (AK)  Multiple AKs on the scalp and forehead. Discussed imiquimod for treatment to prevent malignancy.  - Apply  imiquimod to the scalp and forehead twice weekly for six weeks.  In particular will treat area around Mohs site and temples  - Monitor for changes or signs of malignancy.  - Use Aquaphor for inflammation.    Erythematous scaling keratotic papules noted at sites noted on map  Actinic Keratoses.  Precancerous nature discussed. Sun protection, sunscreen/ blocks encouraged Lesions treated with cryo- .  Biopsy if not resolved.    Left scalp toward vertex    AK at right infraorbital cheek actinic keratoses.  Precancerous nature discussed.  Treatment options reviewed at length we will proceed with topical therapy with   Aldara twice weekly for   6   week course  of actual medication use and may alternate weeks if necessary.  Increased redness scaling crusting irritation pain tenderness potential discussed.  Anticipate one month for resolution of symptoms.  Plan recheck in one month after completion of treatment course.  Consider alternative treatment biopsy if not resolved.    Benign Keratosis  Benign keratoses on the left forehead and right temple. Cosmetic concern for his wife. Discussed imiquimod for potential precancerous lesions.  - Apply imiquimod to the left forehead and right temple twice weekly for six weeks.  - Monitor for changes or signs of malignancy.    Follow-up  - Schedule follow-up in six months.  Numerous benign keratoses vertex, left temple  Extensive lentigines waxy tan papules over the scalp.  Larger lesions at occipital scalp.    Patient notes worsening onychomycotic changes left foot toenails.  Increasingly thick painful.  Patient still very active.  Discussed options..  tavaborole every day  May take a year to grow out.  May use over-the-counter antifungals if tinea pedis worsens.  Continue monitoring    Reassurance regarding benign keratoses consider trial of cryo more bothersome lesions    Actinic Keratoses.  Precancerous nature discussed. Sun protection, sunscreen/ blocks encouraged .  Monitoring  for new lesions.  Sun damage additional recurrent and new actinic keratoses, skin cancers may occur in areas of prior actinic keratoses, related to past sun exposure to minimize current sun exposure.  Sunscreen applied consistently regularly, reapplication and sun protection while driving recommended.    Extensive junctional nevi  Extensive dark nevi fairly uniform benign patterns on dermoscopy  Extensive small nevi.  No significant changes numerous pigmented lesions more than 150 pigmented lesions generalized nearly confluent continue careful follow-up dark lesions     no significant changes on dermatoscopy lesions all benign patterns.  Continue careful follow-up.  History of atypical lesion, family history SCC careful follow-up some protection.  No suspicious lesions.  Patient with lots of sun exposure in the past former runner. Now swimming  Outdoors more children [place] the city    History of steroid-induced rosacea monitor carefully presently redness is stable  Consider niacinamide over-the-counter serum, normal  No other suspicious lesions    Moderate sun damage extensive pigmented lesions as noted.  Family history of skin cancer melanoma in patient's father.  More careful follow-up, every 6 months.  Patient notes brother recently with Mohs as well.  Follow-up annually or as needed  Please refer to map for specific lesions.  See additional diagnoses.  Pros cons of various therapies, risks benefits discussed.Pathophysiology discussed with patient.  Therapeutic options reviewed.  See  Medications in grid.  Instructions reviewed at length.    Benign nevi, seborrheic  keratoses, cherry angiomas:  Reassurance regarding other benign skin lesions.Signs and symptoms of skin cancer, ABCDE's of melanoma discussed with patient. Sunscreen use, sun protection, self exams reviewed.  Followup as noted RTC routine checkup 6 mos - one year or p.r.n.  Encounter Times   Including precharting, reviewing chart, prior notes  obtaining history: 10 minutes, medical exam :10 minutes, notes on body map, plan, counseling 10minutes My total time spent caring for the patient on the day of the encounter: 30 minutes     The patient indicates understanding of these issues and agrees to the plan.  The patient is asked to return as noted in follow-up/ above.    This note was generated using Dragon voice recognition software.  Please contact me regarding any confusion resulting from errors in recognition.

## 2025-07-15 NOTE — ASSESSMENT & PLAN NOTE
Exercise and diet advised. CMP, lipid panel, PSA  Flu shot during the fall  Shingles vaccine - advised, he will continue to think about this   Advanced directive information provided. normocephalic , atraumatic , Oral cavity appearance normal,no ulcers

## 2025-08-11 ENCOUNTER — OFFICE VISIT (OUTPATIENT)
Dept: OTOLARYNGOLOGY | Facility: CLINIC | Age: 65
End: 2025-08-11

## 2025-08-11 VITALS — BODY MASS INDEX: 21.69 KG/M2 | HEIGHT: 76.5 IN | WEIGHT: 180 LBS

## 2025-08-11 DIAGNOSIS — D16.4 OSTEOMA OF EAR CANAL: ICD-10-CM

## 2025-08-11 DIAGNOSIS — H90.3 SENSORINEURAL HEARING LOSS (SNHL) OF BOTH EARS: ICD-10-CM

## 2025-08-11 DIAGNOSIS — H61.92 LESION OF EXTERNAL EAR CANAL, LEFT: Primary | ICD-10-CM

## 2025-08-11 PROCEDURE — 99213 OFFICE O/P EST LOW 20 MIN: CPT | Performed by: STUDENT IN AN ORGANIZED HEALTH CARE EDUCATION/TRAINING PROGRAM

## 2025-08-11 PROCEDURE — 92504 EAR MICROSCOPY EXAMINATION: CPT | Performed by: STUDENT IN AN ORGANIZED HEALTH CARE EDUCATION/TRAINING PROGRAM

## 2025-08-11 PROCEDURE — 3008F BODY MASS INDEX DOCD: CPT | Performed by: STUDENT IN AN ORGANIZED HEALTH CARE EDUCATION/TRAINING PROGRAM

## (undated) NOTE — MR AVS SNAPSHOT
[place]  [address]  [phone]               Thank you for choosing us for your health care visit with [facility].  Jadyn Franco MD.  We are glad to serve you and happy to provide you with this dimas Ciclopirox 8 % Soln   Apply 1 Application topically nightly. Efinaconazole 10 % Soln   Apply 1 Application topically daily. Use daily   Commonly known as:  JUBLIA           erythromycin 5 MG/GM Oint   Place 1 Application into both eyes nightly.

## (undated) NOTE — LETTER
9/27/2017    [address] [place] [address]        [address] [facility] [address] [id number]            Dear Catherine Knight,      Our records indicate that you are due for an appointment for a Colonoscopy on or about October 2017 with Endy Tadeo MD.    Please call our